# Patient Record
Sex: FEMALE | Race: WHITE | Employment: OTHER | URBAN - METROPOLITAN AREA
[De-identification: names, ages, dates, MRNs, and addresses within clinical notes are randomized per-mention and may not be internally consistent; named-entity substitution may affect disease eponyms.]

---

## 2018-01-30 ENCOUNTER — HOSPITAL ENCOUNTER (OUTPATIENT)
Dept: MAMMOGRAPHY | Facility: HOSPITAL | Age: 68
Discharge: HOME | End: 2018-01-30
Attending: OTOLARYNGOLOGY
Payer: MEDICARE

## 2018-01-30 DIAGNOSIS — Z12.39 ENCOUNTER FOR SPECIAL SCREENING EXAMINATION FOR NEOPLASM OF BREAST: ICD-10-CM

## 2018-01-30 PROCEDURE — 77067 SCR MAMMO BI INCL CAD: CPT | Performed by: INTERNAL MEDICINE

## 2018-10-04 ENCOUNTER — HOSPITAL ENCOUNTER (OUTPATIENT)
Dept: MRI IMAGING | Facility: HOSPITAL | Age: 68
Discharge: HOME | End: 2018-10-04
Attending: ORTHOPAEDIC SURGERY
Payer: MEDICARE

## 2018-10-04 DIAGNOSIS — M75.121 COMPLETE TEAR OF RIGHT ROTATOR CUFF: ICD-10-CM

## 2018-10-04 PROCEDURE — 73221 MRI JOINT UPR EXTREM W/O DYE: CPT | Performed by: ORTHOPAEDIC SURGERY

## 2018-10-16 ENCOUNTER — OFFICE VISIT (OUTPATIENT)
Dept: AUDIOLOGY | Facility: CLINIC | Age: 68
End: 2018-10-16
Payer: MEDICARE

## 2018-10-16 ENCOUNTER — OFFICE VISIT (OUTPATIENT)
Dept: OTOLARYNGOLOGY | Facility: CLINIC | Age: 68
End: 2018-10-16
Payer: MEDICARE

## 2018-10-16 VITALS
HEIGHT: 64 IN | SYSTOLIC BLOOD PRESSURE: 136 MMHG | WEIGHT: 200 LBS | DIASTOLIC BLOOD PRESSURE: 86 MMHG | BODY MASS INDEX: 34.15 KG/M2 | TEMPERATURE: 98 F

## 2018-10-16 DIAGNOSIS — H90.3 SENSORINEURAL HEARING LOSS, BILATERAL: Primary | ICD-10-CM

## 2018-10-16 DIAGNOSIS — H91.93 BILATERAL HEARING LOSS, UNSPECIFIED HEARING LOSS TYPE: Primary | ICD-10-CM

## 2018-10-16 PROCEDURE — 92557 COMPREHENSIVE HEARING TEST: CPT | Performed by: AUDIOLOGIST

## 2018-10-16 PROCEDURE — G0463 HOSPITAL OUTPT CLINIC VISIT: HCPCS | Performed by: OTOLARYNGOLOGY

## 2018-10-16 PROCEDURE — 92567 TYMPANOMETRY: CPT | Performed by: AUDIOLOGIST

## 2018-10-16 PROCEDURE — 99214 OFFICE O/P EST MOD 30 MIN: CPT | Performed by: OTOLARYNGOLOGY

## 2018-10-16 RX ORDER — LISINOPRIL 5 MG/1
5 TABLET ORAL DAILY
COMMUNITY

## 2018-10-16 NOTE — PROGRESS NOTES
Shaneka Palacios is a 77year old female. Patient presents with:  Hearing Loss: bilateral for several years       HISTORY OF PRESENT ILLNESS  She presents with a history of decreased hearing which started about 6 months ago.  She's noted that her right ea History      Marital status:       Spouse name: Not on file      Number of children: Not on file      Years of education: Not on file      Highest education level: Not on file    Social Needs      Financial resource strain: Not on file      Food ins Normal Overall appearance - Normal.   Psychiatric Normal Orientation - Oriented to time, place, person & situation. Appropriate mood and affect.    Neck Exam Normal Inspection - Normal. Palpation - Normal. Parotid gland - Normal. Thyroid gland - Normal.   E hearing loss, unspecified hearing loss type  Slight progression of her high-frequency loss bilaterally and a new asymmetry noted on the right ear at the low frequencies. I did recommend an ABR to rule out a retrocochlear abnormality.   Routine hearing test

## 2018-10-16 NOTE — PROGRESS NOTES
AUDIOGRAM     Kami Boyce was referred for testing by Antonio Nance to evaluate hearing. 9/10/1950  [id number]    Pt noted increased difficulty understanding speech and the TV is played loudly.     Otoscopic Inspection:  Right ear:  No cerumen  [facility] pending medical clearance.     Thank you for this referral.    Hali Carrillo  10/16/2018

## 2018-10-30 ENCOUNTER — OFFICE VISIT (OUTPATIENT)
Dept: AUDIOLOGY | Facility: CLINIC | Age: 68
End: 2018-10-30
Payer: MEDICARE

## 2018-10-30 DIAGNOSIS — IMO0001 ASYMMETRICAL HEARING LOSS, RIGHT: Primary | ICD-10-CM

## 2018-10-30 PROCEDURE — 92585 AUDITORY EVOKED POTENTIAL: CPT | Performed by: AUDIOLOGIST

## 2018-10-30 NOTE — PROGRESS NOTES
ADULT AUDITORY BRAINSTEM RESPONSE (ABR) TEST RESULTS    Ane Vishal was referred for testing by Gwen Mares. Otoscopic Inspection:  Right ear:  no cerumen  Left ear: no cerumen    Stimulus used:  90, [id number] rarefacting clicks presented at 11. 0

## 2018-11-05 ENCOUNTER — TELEPHONE (OUTPATIENT)
Dept: OTOLARYNGOLOGY | Facility: CLINIC | Age: 68
End: 2018-11-05

## 2018-11-05 NOTE — TELEPHONE ENCOUNTER
Marleen Hernandez MD  [facility] Staff             Please let her know that ABR normal. This suggests no tumor of the inner ear nerve.   RTC in 6 months for repeat audiogram.

## 2019-03-22 ENCOUNTER — HOSPITAL ENCOUNTER (OUTPATIENT)
Dept: MAMMOGRAPHY | Facility: HOSPITAL | Age: 69
Discharge: HOME | End: 2019-03-22
Attending: INTERNAL MEDICINE
Payer: MEDICARE

## 2019-03-22 DIAGNOSIS — Z12.31 ENCOUNTER FOR SCREENING MAMMOGRAM FOR MALIGNANT NEOPLASM OF BREAST: ICD-10-CM

## 2019-03-22 PROCEDURE — 77063 BREAST TOMOSYNTHESIS BI: CPT | Performed by: INTERNAL MEDICINE

## 2019-03-22 PROCEDURE — 77067 SCR MAMMO BI INCL CAD: CPT | Performed by: INTERNAL MEDICINE

## 2019-05-13 ENCOUNTER — TELEPHONE (OUTPATIENT)
Dept: OTOLARYNGOLOGY | Facility: CLINIC | Age: 69
End: 2019-05-13

## 2019-05-13 NOTE — TELEPHONE ENCOUNTER
----- Message from Tiki Young RN sent at 11/5/2018  3:56 PM CST -----  Pt is due for repeat hearing test in 6 months.

## 2019-06-28 ENCOUNTER — HOSPITAL ENCOUNTER (OUTPATIENT)
Dept: GENERAL RADIOLOGY | Facility: HOSPITAL | Age: 69
Discharge: HOME | End: 2019-06-28
Attending: PHYSICAL MEDICINE & REHABILITATION
Payer: MEDICARE

## 2019-06-28 ENCOUNTER — TELEPHONE (OUTPATIENT)
Dept: NEUROLOGY | Facility: CLINIC | Age: 69
End: 2019-06-28

## 2019-06-28 ENCOUNTER — OFFICE VISIT (OUTPATIENT)
Dept: NEUROLOGY | Facility: CLINIC | Age: 69
End: 2019-06-28
Payer: MEDICARE

## 2019-06-28 VITALS
BODY MASS INDEX: 34.15 KG/M2 | RESPIRATION RATE: 16 BRPM | HEIGHT: 64 IN | DIASTOLIC BLOOD PRESSURE: 88 MMHG | WEIGHT: 200 LBS | HEART RATE: 80 BPM | SYSTOLIC BLOOD PRESSURE: 144 MMHG

## 2019-06-28 DIAGNOSIS — Z87.19 HISTORY OF ESOPHAGEAL REFLUX: ICD-10-CM

## 2019-06-28 DIAGNOSIS — G47.00 INSOMNIA, UNSPECIFIED TYPE: ICD-10-CM

## 2019-06-28 DIAGNOSIS — M54.2 CERVICALGIA OF OCCIPITO-ATLANTO-AXIAL REGION: Primary | ICD-10-CM

## 2019-06-28 DIAGNOSIS — M54.2 CERVICALGIA OF OCCIPITO-ATLANTO-AXIAL REGION: ICD-10-CM

## 2019-06-28 PROBLEM — M75.100 TEAR OF ROTATOR CUFF: Status: ACTIVE | Noted: 2019-04-04

## 2019-06-28 PROBLEM — M70.71 BURSITIS OF RIGHT HIP: Status: ACTIVE | Noted: 2019-04-04

## 2019-06-28 PROCEDURE — 99205 OFFICE O/P NEW HI 60 MIN: CPT | Performed by: PHYSICAL MEDICINE & REHABILITATION

## 2019-06-28 PROCEDURE — 72052 X-RAY EXAM NECK SPINE 6/>VWS: CPT | Performed by: PHYSICAL MEDICINE & REHABILITATION

## 2019-06-28 RX ORDER — COVID-19 ANTIGEN TEST
220 KIT MISCELLANEOUS 2 TIMES DAILY PRN
COMMUNITY

## 2019-06-28 RX ORDER — MELOXICAM 15 MG/1
15 TABLET ORAL DAILY
Qty: 30 TABLET | Refills: 0 | Status: SHIPPED | OUTPATIENT
Start: 2019-06-28 | End: 2019-08-06

## 2019-06-28 RX ORDER — CYCLOBENZAPRINE HCL 10 MG
10 TABLET ORAL NIGHTLY
Qty: 30 TABLET | Refills: 0 | Status: SHIPPED | OUTPATIENT
Start: 2019-06-28 | End: 2019-07-28

## 2019-06-28 NOTE — PROGRESS NOTES
[address]  Progress Note    CHIEF COMPLAINT:  Patient presents with:  Neck Pain: Patient presents for neck pain for the past 2 years, has worsened over the past 6 months.  C/o left side neck pain, pain radia Meloxicam 15 MG Oral Tab Take 1 tablet (15 mg total) by mouth daily. Disp: 30 tablet Rfl: 0   Cyclobenzaprine HCl 10 MG Oral Tab Take 1 tablet (10 mg total) by mouth nightly. Disp: 30 tablet Rfl: 0   lisinopril 5 MG Oral Tab Take 5 mg by mouth daily.  Dis Position: Sitting, Cuff Size: adult)   Pulse 80   Resp 16   Ht 64\"   Wt 200 lb   BMI 34.33 kg/m²     Body mass index is 34.33 kg/m². General: No immediate distress  Head: Normocephalic/ Atraumatic  Eyes: Extra-occular movements intact.    Ears: No aur 7/12/2019). Discharge Instructions were provided as documented in AVS summary. The patient was in agreement with the assessment and plan. All questions were answered. There were no barriers to learning.         Radames Mortimer, MD  Physical Medicin

## 2019-06-29 PROBLEM — M54.2 CERVICALGIA OF OCCIPITO-ATLANTO-AXIAL REGION: Status: ACTIVE | Noted: 2019-06-29

## 2019-06-29 PROBLEM — Z87.19 HISTORY OF ESOPHAGEAL REFLUX: Status: ACTIVE | Noted: 2019-06-29

## 2019-07-01 ENCOUNTER — MED REC SCAN ONLY (OUTPATIENT)
Dept: NEUROLOGY | Facility: CLINIC | Age: 69
End: 2019-07-01

## 2019-07-01 ENCOUNTER — TELEPHONE (OUTPATIENT)
Dept: NEUROLOGY | Facility: CLINIC | Age: 69
End: 2019-07-01

## 2019-07-01 NOTE — TELEPHONE ENCOUNTER
----- Message from Syeda Denny MD sent at 7/1/2019  9:59 AM CDT -----  I personally reviewed these plain film x-rays of the cervical spine done on 6/28/2019. They show extensive osteoarthritis of the facet joints bilaterally.   Of note, there is left

## 2019-07-08 ENCOUNTER — OFFICE VISIT (OUTPATIENT)
Dept: NEUROLOGY | Facility: CLINIC | Age: 69
End: 2019-07-08
Payer: MEDICARE

## 2019-07-08 ENCOUNTER — TELEPHONE (OUTPATIENT)
Dept: NEUROLOGY | Facility: CLINIC | Age: 69
End: 2019-07-08

## 2019-07-08 VITALS
BODY MASS INDEX: 34.15 KG/M2 | SYSTOLIC BLOOD PRESSURE: 140 MMHG | WEIGHT: 200 LBS | HEART RATE: 68 BPM | RESPIRATION RATE: 16 BRPM | DIASTOLIC BLOOD PRESSURE: 88 MMHG | HEIGHT: 64 IN

## 2019-07-08 DIAGNOSIS — G47.00 INSOMNIA, UNSPECIFIED TYPE: ICD-10-CM

## 2019-07-08 DIAGNOSIS — Z87.19 HISTORY OF ESOPHAGEAL REFLUX: ICD-10-CM

## 2019-07-08 DIAGNOSIS — M54.2 CERVICALGIA OF OCCIPITO-ATLANTO-AXIAL REGION: Primary | ICD-10-CM

## 2019-07-08 DIAGNOSIS — M79.18 MYOFASCIAL PAIN: ICD-10-CM

## 2019-07-08 PROCEDURE — 20552 NJX 1/MLT TRIGGER POINT 1/2: CPT | Performed by: PHYSICAL MEDICINE & REHABILITATION

## 2019-07-08 PROCEDURE — 99214 OFFICE O/P EST MOD 30 MIN: CPT | Performed by: PHYSICAL MEDICINE & REHABILITATION

## 2019-07-08 RX ORDER — DULOXETIN HYDROCHLORIDE 30 MG/1
30 CAPSULE, DELAYED RELEASE ORAL DAILY
Qty: 30 CAPSULE | Refills: 0 | Status: SHIPPED | OUTPATIENT
Start: 2019-07-08 | End: 2019-08-06

## 2019-07-08 NOTE — PROGRESS NOTES
[address]  Progress Note    CHIEF COMPLAINT:  Patient presents with:  Neck Pain: Patient presents for follow up on neck pain, LOV:6/28/19.  States she still has pain, stopped the flexeril did not get relief, Take 1 capsule (30 mg total) by mouth daily. Disp: 30 capsule Rfl: 0   Naproxen Sodium (ALEVE) 220 MG Oral Cap Take 220 mg by mouth 2 (two) times daily as needed. Disp:  Rfl:    Meloxicam 15 MG Oral Tab Take 1 tablet (15 mg total) by mouth daily.  Disp: 30 and limited flexion with pain. Extension is also limited but less painful. Trapezii are nontender laterally.   Neuro:   Cognition: alert & oriented x 3, attentive, comprehention intact, spontaneous speech intact  Strength:  Upper extremities have 5/5 stre agreement with the assessment and plan. All questions were answered. There were no barriers to learning.         Geri Bennett MD  Physical Medicine and Rehabilitation/Sports Medicine  MEDICAL CENTER [facility]

## 2019-07-08 NOTE — TELEPHONE ENCOUNTER
Medicare online for insurance coverage of Trigger point injection left suboccipital cpt code   . Insurance was verified and procedure is a cover benefit and does not require authorization. Will inform Nursing.

## 2019-07-11 RX ORDER — TRIAMCINOLONE ACETONIDE 40 MG/ML
20 INJECTION, SUSPENSION INTRA-ARTICULAR; INTRAMUSCULAR ONCE
Status: COMPLETED | OUTPATIENT
Start: 2019-07-08 | End: 2019-07-08

## 2019-07-11 RX ORDER — LIDOCAINE HYDROCHLORIDE 10 MG/ML
2 INJECTION, SOLUTION INFILTRATION; PERINEURAL ONCE
Status: COMPLETED | OUTPATIENT
Start: 2019-07-08 | End: 2019-07-08

## 2019-07-25 RX ORDER — MELOXICAM 15 MG/1
15 TABLET ORAL DAILY
Qty: 30 TABLET | Refills: 0 | OUTPATIENT
Start: 2019-07-25

## 2019-07-25 NOTE — TELEPHONE ENCOUNTER
Medication request: Meloxicam 15 mg, Take 1 tablet by mouth daily.  Qt 30 Refills 0    LOV: 7/8/19  NOV: 8/6/19    Last refill: 6/28/19    -Should the patient still have Meloxicam?

## 2019-07-25 NOTE — TELEPHONE ENCOUNTER
It would be best if she stopped it now that she's on Cymbalta. Would mess up her stomach long term.  (Has GERD)

## 2019-07-30 NOTE — TELEPHONE ENCOUNTER
Medication request: Cymbalta 30 mg, Take 1 capsule by mouth daily.  Qt 30 Refills 0    LOV: 7/8/19  NOV: 8/6/19    Last refill: 7/8/19

## 2019-08-06 ENCOUNTER — OFFICE VISIT (OUTPATIENT)
Dept: NEUROLOGY | Facility: CLINIC | Age: 69
End: 2019-08-06
Payer: MEDICARE

## 2019-08-06 VITALS
SYSTOLIC BLOOD PRESSURE: 142 MMHG | WEIGHT: 200 LBS | HEIGHT: 64 IN | BODY MASS INDEX: 34.15 KG/M2 | DIASTOLIC BLOOD PRESSURE: 78 MMHG | RESPIRATION RATE: 16 BRPM | HEART RATE: 76 BPM

## 2019-08-06 DIAGNOSIS — Z87.19 HISTORY OF ESOPHAGEAL REFLUX: ICD-10-CM

## 2019-08-06 DIAGNOSIS — G47.00 INSOMNIA, UNSPECIFIED TYPE: ICD-10-CM

## 2019-08-06 DIAGNOSIS — M79.18 MYOFASCIAL PAIN: ICD-10-CM

## 2019-08-06 DIAGNOSIS — M54.2 CERVICALGIA OF OCCIPITO-ATLANTO-AXIAL REGION: Primary | ICD-10-CM

## 2019-08-06 PROCEDURE — 99214 OFFICE O/P EST MOD 30 MIN: CPT | Performed by: PHYSICAL MEDICINE & REHABILITATION

## 2019-08-06 RX ORDER — DULOXETIN HYDROCHLORIDE 30 MG/1
30 CAPSULE, DELAYED RELEASE ORAL DAILY
Qty: 30 CAPSULE | Refills: 0 | OUTPATIENT
Start: 2019-08-06

## 2019-08-06 RX ORDER — DULOXETIN HYDROCHLORIDE 60 MG/1
60 CAPSULE, DELAYED RELEASE ORAL DAILY
Qty: 30 CAPSULE | Refills: 0 | Status: SHIPPED | OUTPATIENT
Start: 2019-08-06 | End: 2019-08-28

## 2019-08-06 NOTE — PROGRESS NOTES
[address]  Progress Note    CHIEF COMPLAINT:  Patient presents with:  Neck Pain: Patient presents for follow up on neck pain, LOV:7/8/19.  Had a trigger point injection on last visit, doing PT and feels it s HCl 60 MG Oral Cap DR Particles Take 1 capsule (60 mg total) by mouth daily. Disp: 30 capsule Rfl: 0   PEG 3350-KCl-Na Bicarb-NaCl (TRILYTE) 420 g Oral Recon Soln Take 420 g by mouth as needed.  Disp: 1 Bottle Rfl: 0   Naproxen Sodium (ALEVE) 220 MG Oral Ca & oriented x 3, attentive, comprehention intact, spontaneous speech intact  Strength:  Upper extremities have 5/5 strength  Sensation: Normal upper extremities  Reflexes: Normal upper extremities  Spurling's sign: neg        Data    Radiology Imaging:  magali

## 2019-08-28 RX ORDER — DULOXETIN HYDROCHLORIDE 60 MG/1
60 CAPSULE, DELAYED RELEASE ORAL DAILY
Qty: 30 CAPSULE | Refills: 0 | Status: SHIPPED | OUTPATIENT
Start: 2019-08-28 | End: 2019-09-20

## 2019-08-28 RX ORDER — DULOXETIN HYDROCHLORIDE 60 MG/1
CAPSULE, DELAYED RELEASE ORAL
Qty: 30 CAPSULE | Refills: 0 | OUTPATIENT
Start: 2019-08-28

## 2019-08-28 NOTE — TELEPHONE ENCOUNTER
Medication request: Duloxetine HCI 60 MG  Take 1 capsule by mouth daily    LOV   8/6/2019  NOV  9/6/2019

## 2019-09-06 ENCOUNTER — TELEPHONE (OUTPATIENT)
Dept: NEUROLOGY | Facility: CLINIC | Age: 69
End: 2019-09-06

## 2019-09-06 ENCOUNTER — OFFICE VISIT (OUTPATIENT)
Dept: NEUROLOGY | Facility: CLINIC | Age: 69
End: 2019-09-06
Payer: MEDICARE

## 2019-09-06 VITALS
HEIGHT: 64 IN | WEIGHT: 205 LBS | BODY MASS INDEX: 35 KG/M2 | SYSTOLIC BLOOD PRESSURE: 144 MMHG | HEART RATE: 72 BPM | RESPIRATION RATE: 16 BRPM | DIASTOLIC BLOOD PRESSURE: 82 MMHG

## 2019-09-06 DIAGNOSIS — M47.812 CERVICAL SPONDYLOSIS: Primary | ICD-10-CM

## 2019-09-06 DIAGNOSIS — M79.18 MYOFASCIAL PAIN: ICD-10-CM

## 2019-09-06 DIAGNOSIS — Z87.19 HISTORY OF ESOPHAGEAL REFLUX: ICD-10-CM

## 2019-09-06 DIAGNOSIS — G47.00 INSOMNIA, UNSPECIFIED TYPE: ICD-10-CM

## 2019-09-06 PROCEDURE — 99214 OFFICE O/P EST MOD 30 MIN: CPT | Performed by: PHYSICAL MEDICINE & REHABILITATION

## 2019-09-06 NOTE — TELEPHONE ENCOUNTER
Medicare Online for insurance coverage of  Left C1-2 and C2-3 facet injections cpt codes 26301, 94409. Insurance was verified and procedure is a covered benefit and does not require authorization. Will inform Nursing.

## 2019-09-06 NOTE — TELEPHONE ENCOUNTER
Patient has been scheduled for a Left C1-2 and C2-3 facet injections on 09/12/19 at the [facility]. Medications and allergies reviewed. Patient informed to hold aspirins, nsaids, blood thinners, multivitamins, vitamin E and fish oils 3-7 days prior to procedure.

## 2019-09-06 NOTE — PROGRESS NOTES
[address]  Progress Note    CHIEF COMPLAINT:  Patient presents with:  Neck Pain: Patient present for a follow up on neck pain.  States that the pain is not getting better, the ache in the neck is always ther (ALEVE) 220 MG Oral Cap Take 220 mg by mouth 2 (two) times daily as needed. Disp:  Rfl:    lisinopril 5 MG Oral Tab Take 5 mg by mouth daily. Disp:  Rfl:    Metoprolol Succinate ER 50 MG Oral Tablet 24 Hr Take 50 mg by mouth daily.    Disp:  Rfl:    Calcium strength  Sensation: Normal upper extremities  Reflexes: Normal upper extremities  Spurling's sign: neg        Data    Radiology Imaging:  plain film x-rays of the cervical spine done on 6/28/2019. Christina Guevara show extensive osteoarthritis of the facet joints bi

## 2019-09-12 ENCOUNTER — OFFICE VISIT (OUTPATIENT)
Dept: SURGERY | Facility: CLINIC | Age: 69
End: 2019-09-12
Payer: MEDICARE

## 2019-09-12 DIAGNOSIS — M79.18 MYOFASCIAL PAIN: ICD-10-CM

## 2019-09-12 DIAGNOSIS — M47.812 CERVICAL SPONDYLOSIS: Primary | ICD-10-CM

## 2019-09-12 PROCEDURE — 64490 INJ PARAVERT F JNT C/T 1 LEV: CPT | Performed by: PHYSICAL MEDICINE & REHABILITATION

## 2019-09-12 PROCEDURE — 20552 NJX 1/MLT TRIGGER POINT 1/2: CPT | Performed by: PHYSICAL MEDICINE & REHABILITATION

## 2019-09-15 PROBLEM — M47.812 CERVICAL SPONDYLOSIS: Status: ACTIVE | Noted: 2019-09-15

## 2019-09-15 NOTE — PROCEDURES
Preoperative Diagnosis:  (M47.812) Cervical spondylosis  (primary encounter diagnosis)    (M79.18) Myofascial pain       Postoperative Diagnosis:  (K98.718) Cervical spondylosis  (primary encounter diagnosis)    (M79.18) Myofascial pain       Procedures:

## 2019-09-19 PROCEDURE — 88305 TISSUE EXAM BY PATHOLOGIST: CPT | Performed by: INTERNAL MEDICINE

## 2019-09-20 RX ORDER — DULOXETIN HYDROCHLORIDE 60 MG/1
CAPSULE, DELAYED RELEASE ORAL
Qty: 30 CAPSULE | Refills: 0 | Status: SHIPPED | OUTPATIENT
Start: 2019-09-20 | End: 2019-10-15

## 2019-09-20 NOTE — TELEPHONE ENCOUNTER
Medication request: Duloxetine 60 mg, Take 1 capsule by mouth daily.  Qt 30 Refills 0     LOV: 9/6/19  NOV:10/2/19    Last refill: 8/28/19

## 2019-10-02 ENCOUNTER — TELEPHONE (OUTPATIENT)
Dept: NEUROLOGY | Facility: CLINIC | Age: 69
End: 2019-10-02

## 2019-10-02 ENCOUNTER — OFFICE VISIT (OUTPATIENT)
Dept: NEUROLOGY | Facility: CLINIC | Age: 69
End: 2019-10-02
Payer: MEDICARE

## 2019-10-02 VITALS
BODY MASS INDEX: 34.66 KG/M2 | HEART RATE: 84 BPM | RESPIRATION RATE: 16 BRPM | HEIGHT: 64 IN | SYSTOLIC BLOOD PRESSURE: 110 MMHG | DIASTOLIC BLOOD PRESSURE: 86 MMHG | WEIGHT: 203 LBS

## 2019-10-02 DIAGNOSIS — M47.812 CERVICAL SPONDYLOSIS: Primary | ICD-10-CM

## 2019-10-02 DIAGNOSIS — Z87.19 HISTORY OF ESOPHAGEAL REFLUX: ICD-10-CM

## 2019-10-02 PROCEDURE — 99214 OFFICE O/P EST MOD 30 MIN: CPT | Performed by: PHYSICAL MEDICINE & REHABILITATION

## 2019-10-02 NOTE — TELEPHONE ENCOUNTER
Medicare Online for insurance coverage of Left C0-C1,C1-C2, C2-C3  facet injections **SCHEDULE WITH DR. Sue Ramsay ONLY PLEASE** cpt codes [id number], [id number], [id number]. Insurance was verified and procedure is a covered benefit and does not require authorization.   Will

## 2019-10-02 NOTE — PROGRESS NOTES
[address]  Progress Note    CHIEF COMPLAINT:  Patient presents with:  Neck Pain: Patient presents for follow up on Left C23 Facet injection, got 25% relief from the injection, rated pain a 6/10.         Hist Oral Tab Take 5 mg by mouth daily. • Metoprolol Succinate ER 50 MG Oral Tablet 24 Hr Take 50 mg by mouth daily. • Calcium Carbonate-Vitamin D (CALCIUM 500 + D OR) Take 1 tablet by mouth daily.      • allopurinol 100 MG Oral Tab Take 100 mg by mout of the cervical spine done on 6/28/2019. Megan Post show extensive osteoarthritis of the facet joints bilaterally.  Of note, there is left C1-2 joint arthritis which corresponds with her symptoms.        ASSESSMENT AND PLAN:  1.  Cervical spondylosis  I recommen

## 2019-10-02 NOTE — TELEPHONE ENCOUNTER
Patient has been scheduled for a   Left C0-C1,C1-C2, C2-C3  facet injections on 10/29/19 at the [facility]. Medications and allergies reviewed.  Patient informed to hold aspirins, nsaids, blood thinners, multivitamins, vitamin E and fish oils 3-7 days prior to pr

## 2019-10-15 RX ORDER — DULOXETIN HYDROCHLORIDE 60 MG/1
60 CAPSULE, DELAYED RELEASE ORAL
Qty: 90 CAPSULE | Refills: 0 | Status: SHIPPED | OUTPATIENT
Start: 2019-10-15 | End: 2020-01-09

## 2019-10-15 NOTE — TELEPHONE ENCOUNTER
Medication request: Duloxetine 60 mg, Take 1 capsule by mouth daily. Qt 90 Refills 0     LOV: 10/2/19  NOV: None  Left C0-C1,C1-C2, C2-C3  facet injections scheduled w/: 10/29/19      Last refill:9/20/19    Requesting a 90 day supply.

## 2019-10-29 ENCOUNTER — OFFICE VISIT (OUTPATIENT)
Dept: SURGERY | Facility: CLINIC | Age: 69
End: 2019-10-29
Payer: MEDICARE

## 2019-10-29 DIAGNOSIS — M47.812 ARTHROPATHY OF CERVICAL FACET JOINT: Primary | ICD-10-CM

## 2019-10-29 DIAGNOSIS — M54.2 CERVICALGIA OF OCCIPITO-ATLANTO-AXIAL REGION: ICD-10-CM

## 2019-10-29 PROCEDURE — 64492 INJ PARAVERT F JNT C/T 3 LEV: CPT | Performed by: PHYSICAL MEDICINE & REHABILITATION

## 2019-10-29 PROCEDURE — 64491 INJ PARAVERT F JNT C/T 2 LEV: CPT | Performed by: PHYSICAL MEDICINE & REHABILITATION

## 2019-10-29 PROCEDURE — 64490 INJ PARAVERT F JNT C/T 1 LEV: CPT | Performed by: PHYSICAL MEDICINE & REHABILITATION

## 2019-10-29 NOTE — PROCEDURES
Mark Shepherd.    CERVICAL Z-JOINT/FACET INJECTION  NAME:  Treva Ro    MR #:    [id number] :  9/10/1950     PHYSICIAN:  Chidi Davalos        Operative Report    DATE OF PROCEDURE: 10/29/2019   PREOPERATIVE DIAGNOSES: 1.  Art throughout the whole procedure, prior to injection of any medication, aspiration was performed. No blood, fluid, or air was aspirated at anytime.

## 2019-10-31 ENCOUNTER — TELEPHONE (OUTPATIENT)
Dept: NEUROLOGY | Facility: CLINIC | Age: 69
End: 2019-10-31

## 2019-10-31 DIAGNOSIS — M54.2 CERVICALGIA OF OCCIPITO-ATLANTO-AXIAL REGION: Primary | ICD-10-CM

## 2019-10-31 DIAGNOSIS — M99.01 SOMATIC DYSFUNCTION OF SPINE, CERVICAL: Primary | ICD-10-CM

## 2019-10-31 NOTE — TELEPHONE ENCOUNTER
S/w patient and informed her I will have Dr. Kirsten Story place the order for OMT with Dr. Rigo Gage.

## 2019-11-01 ENCOUNTER — TELEPHONE (OUTPATIENT)
Dept: NEUROLOGY | Facility: CLINIC | Age: 69
End: 2019-11-01

## 2019-11-01 NOTE — TELEPHONE ENCOUNTER
Medicare Online for authorization of procedure OMT of cervical region cpt code 79818. Procedure is a covered benefit and does not require authorization.

## 2019-11-04 NOTE — TELEPHONE ENCOUNTER
Per Akosua Gann:  Medicare Online for authorization of procedure OMT of cervical region cpt code 90994. Procedure is a covered benefit and does not require authorization. Front Office:  Please schedule this patient for appointment with Dr. Karlos Chacon for OMT.

## 2019-11-07 ENCOUNTER — OFFICE VISIT (OUTPATIENT)
Dept: NEUROLOGY | Facility: CLINIC | Age: 69
End: 2019-11-07
Payer: MEDICARE

## 2019-11-07 VITALS
WEIGHT: 205 LBS | HEIGHT: 64 IN | RESPIRATION RATE: 16 BRPM | DIASTOLIC BLOOD PRESSURE: 68 MMHG | HEART RATE: 84 BPM | SYSTOLIC BLOOD PRESSURE: 120 MMHG | BODY MASS INDEX: 35 KG/M2

## 2019-11-07 DIAGNOSIS — M99.02 THORACIC REGION SOMATIC DYSFUNCTION: ICD-10-CM

## 2019-11-07 DIAGNOSIS — M99.01 CERVICAL (NECK) REGION SOMATIC DYSFUNCTION: ICD-10-CM

## 2019-11-07 PROCEDURE — 98925 OSTEOPATH MANJ 1-2 REGIONS: CPT | Performed by: PHYSICAL MEDICINE & REHABILITATION

## 2019-11-07 NOTE — PROGRESS NOTES
OMT PROCEDURE DOCUMENTATION    OMT Procedure  Procedure: OMT to thoracic, cervical regions   Heat applied: No   Somatic dysfunction body location(s): Cervical, Thoracic   Number of body regions: 2       HEAD CERVICAL          Cervical vertebrae: OA, C1, C2

## 2019-11-14 ENCOUNTER — OFFICE VISIT (OUTPATIENT)
Dept: NEUROLOGY | Facility: CLINIC | Age: 69
End: 2019-11-14
Payer: MEDICARE

## 2019-11-14 VITALS — HEIGHT: 64 IN | BODY MASS INDEX: 35 KG/M2 | WEIGHT: 205 LBS

## 2019-11-14 DIAGNOSIS — M47.812 ARTHROPATHY OF CERVICAL FACET JOINT: ICD-10-CM

## 2019-11-14 DIAGNOSIS — M99.01 SOMATIC DYSFUNCTION OF CERVICAL REGION: ICD-10-CM

## 2019-11-14 PROCEDURE — 98926 OSTEOPATH MANJ 3-4 REGIONS: CPT | Performed by: PHYSICAL MEDICINE & REHABILITATION

## 2019-11-14 NOTE — PROGRESS NOTES
OMT PROCEDURE DOCUMENTATION    OMT Procedure  Procedure: OMT to cervical, thoracic, suboccipital regions   Somatic dysfunction body location(s): Cervical, Thoracic, Head   Number of body regions: 3       HEAD CERVICAL   Severity: Moderate   Technique used:

## 2019-11-26 ENCOUNTER — OFFICE VISIT (OUTPATIENT)
Dept: NEUROLOGY | Facility: CLINIC | Age: 69
End: 2019-11-26
Payer: MEDICARE

## 2019-11-26 VITALS — HEIGHT: 64 IN | BODY MASS INDEX: 35 KG/M2 | WEIGHT: 205 LBS

## 2019-11-26 DIAGNOSIS — M99.01 SOMATIC DYSFUNCTION OF SPINE, CERVICAL: ICD-10-CM

## 2019-11-26 PROCEDURE — 98925 OSTEOPATH MANJ 1-2 REGIONS: CPT | Performed by: PHYSICAL MEDICINE & REHABILITATION

## 2019-11-26 NOTE — PATIENT INSTRUCTIONS
Do exercise number one no more than 6 sets per day, 5-7 repetitions each time. Next week add exercise number 2, 3-5 sets per day, 3-5 repetitions each time.

## 2019-11-26 NOTE — PROGRESS NOTES
OMT PROCEDURE DOCUMENTATION    OMT Procedure  Procedure: OMT to cervical, suboccipital regions   Heat applied: No   Somatic dysfunction body location(s): Cervical   Number of body regions: 1       HEAD CERVICAL          Cervical vertebrae: C2, C1   Severit

## 2019-12-02 ENCOUNTER — OFFICE VISIT (OUTPATIENT)
Dept: NEUROLOGY | Facility: CLINIC | Age: 69
End: 2019-12-02
Payer: MEDICARE

## 2019-12-02 VITALS
WEIGHT: 205 LBS | SYSTOLIC BLOOD PRESSURE: 128 MMHG | RESPIRATION RATE: 16 BRPM | HEART RATE: 76 BPM | BODY MASS INDEX: 35 KG/M2 | DIASTOLIC BLOOD PRESSURE: 72 MMHG | HEIGHT: 64 IN

## 2019-12-02 DIAGNOSIS — M99.01 SOMATIC DYSFUNCTION OF SPINE, CERVICAL: ICD-10-CM

## 2019-12-02 PROCEDURE — 98925 OSTEOPATH MANJ 1-2 REGIONS: CPT | Performed by: PHYSICAL MEDICINE & REHABILITATION

## 2019-12-02 NOTE — PROGRESS NOTES
OMT PROCEDURE DOCUMENTATION    OMT Procedure  Procedure: OMT to cervical, OA region   Heat applied: No   Somatic dysfunction body location(s): Cervical   Number of body regions: 1       HEAD CERVICAL          Cervical vertebrae: C1, C2   Severity: Moderate

## 2019-12-12 ENCOUNTER — MED REC SCAN ONLY (OUTPATIENT)
Dept: NEUROLOGY | Facility: CLINIC | Age: 69
End: 2019-12-12

## 2019-12-12 ENCOUNTER — OFFICE VISIT (OUTPATIENT)
Dept: NEUROLOGY | Facility: CLINIC | Age: 69
End: 2019-12-12
Payer: MEDICARE

## 2019-12-12 VITALS — HEIGHT: 64 IN | BODY MASS INDEX: 35 KG/M2 | WEIGHT: 205 LBS

## 2019-12-12 DIAGNOSIS — M99.01 SOMATIC DYSFUNCTION OF CERVICAL REGION: ICD-10-CM

## 2019-12-12 PROCEDURE — 98925 OSTEOPATH MANJ 1-2 REGIONS: CPT | Performed by: PHYSICAL MEDICINE & REHABILITATION

## 2019-12-12 NOTE — PROGRESS NOTES
OMT PROCEDURE DOCUMENTATION    OMT Procedure  Procedure: OMT to cervical region   Heat applied: No   Somatic dysfunction body location(s): Cervical   Number of body regions: 1       HEAD CERVICAL          Cervical vertebrae: C1, C2   Severity: Moderate   T

## 2020-01-07 ENCOUNTER — OFFICE VISIT (OUTPATIENT)
Dept: NEUROLOGY | Facility: CLINIC | Age: 70
End: 2020-01-07
Payer: MEDICARE

## 2020-01-07 ENCOUNTER — MED REC SCAN ONLY (OUTPATIENT)
Dept: NEUROLOGY | Facility: CLINIC | Age: 70
End: 2020-01-07

## 2020-01-07 VITALS
BODY MASS INDEX: 35.87 KG/M2 | SYSTOLIC BLOOD PRESSURE: 126 MMHG | HEIGHT: 63.5 IN | DIASTOLIC BLOOD PRESSURE: 74 MMHG | WEIGHT: 205 LBS | HEART RATE: 70 BPM

## 2020-01-07 DIAGNOSIS — M99.01 SOMATIC DYSFUNCTION OF CERVICAL REGION: Primary | ICD-10-CM

## 2020-01-07 PROCEDURE — 98925 OSTEOPATH MANJ 1-2 REGIONS: CPT | Performed by: PHYSICAL MEDICINE & REHABILITATION

## 2020-01-07 NOTE — PROGRESS NOTES
OMT PROCEDURE DOCUMENTATION    OMT Procedure  Procedure: OMT to cervical, OA regions   Heat applied: No   Somatic dysfunction body location(s): Cervical   Number of body regions: 1       HEAD CERVICAL          Cervical vertebrae: C1, C2   Severity: Francesca Potash

## 2020-01-09 RX ORDER — DULOXETIN HYDROCHLORIDE 60 MG/1
60 CAPSULE, DELAYED RELEASE ORAL DAILY
Qty: 90 CAPSULE | Refills: 0 | Status: SHIPPED | OUTPATIENT
Start: 2020-01-09 | End: 2020-04-10

## 2020-01-09 NOTE — TELEPHONE ENCOUNTER
Medication request: Duloxetine 60 mg, Take 1 capsule by mouth once daily.  Qt 90 Refills 0    LOV:10/2/19  NOV:1/21/20-With  for OMT    Last refill: 10/15/19

## 2020-01-21 ENCOUNTER — OFFICE VISIT (OUTPATIENT)
Dept: NEUROLOGY | Facility: CLINIC | Age: 70
End: 2020-01-21
Payer: MEDICARE

## 2020-01-21 VITALS — RESPIRATION RATE: 16 BRPM | HEIGHT: 63.75 IN | WEIGHT: 205 LBS | BODY MASS INDEX: 35.43 KG/M2

## 2020-01-21 DIAGNOSIS — M99.01 SOMATIC DYSFUNCTION OF CERVICAL REGION: ICD-10-CM

## 2020-01-21 PROCEDURE — 98925 OSTEOPATH MANJ 1-2 REGIONS: CPT | Performed by: PHYSICAL MEDICINE & REHABILITATION

## 2020-01-21 NOTE — PROGRESS NOTES
OMT PROCEDURE DOCUMENTATION    OMT Procedure  Procedure: OMT thoracic and cervical regions   Heat applied: No   Somatic dysfunction body location(s): Thoracic, Cervical   Number of body regions: 2       HEAD CERVICAL          Cervical vertebrae: C1, C2   S

## 2020-02-04 ENCOUNTER — OFFICE VISIT (OUTPATIENT)
Dept: NEUROLOGY | Facility: CLINIC | Age: 70
End: 2020-02-04
Payer: MEDICARE

## 2020-02-04 VITALS — RESPIRATION RATE: 16 BRPM | BODY MASS INDEX: 35 KG/M2 | HEIGHT: 64 IN | WEIGHT: 205 LBS

## 2020-02-04 DIAGNOSIS — M47.812 ARTHROPATHY OF CERVICAL FACET JOINT: Primary | ICD-10-CM

## 2020-02-04 PROCEDURE — 98925 OSTEOPATH MANJ 1-2 REGIONS: CPT | Performed by: PHYSICAL MEDICINE & REHABILITATION

## 2020-02-04 RX ORDER — NAPROXEN 500 MG/1
TABLET ORAL
Qty: 60 TABLET | Refills: 0 | Status: SHIPPED | OUTPATIENT
Start: 2020-02-04 | End: 2020-02-26

## 2020-02-11 ENCOUNTER — TELEPHONE (OUTPATIENT)
Dept: NEUROLOGY | Facility: CLINIC | Age: 70
End: 2020-02-11

## 2020-02-11 NOTE — TELEPHONE ENCOUNTER
Patient calling with status update after starting Naproxen. She has been using BID since 02/05/20 and states her neck and head pain feels pretty good, at the worst it is a 2/10.  She will try to decrease the medication to once a day after a week and then us

## 2020-02-26 DIAGNOSIS — M47.812 ARTHROPATHY OF CERVICAL FACET JOINT: ICD-10-CM

## 2020-02-26 RX ORDER — NAPROXEN 500 MG/1
TABLET ORAL
Qty: 60 TABLET | Refills: 0 | Status: SHIPPED | OUTPATIENT
Start: 2020-03-04 | End: 2020-04-07

## 2020-02-26 NOTE — TELEPHONE ENCOUNTER
Spoke to patient to get an update. State the Naproxen is great. She took the medication twice for 1 week and since been taking it once daily. The pain is minimal 2/10at time and at time gone but is still taking the Naproxen daily.  Asking to refill the Napr

## 2020-04-07 DIAGNOSIS — M47.812 ARTHROPATHY OF CERVICAL FACET JOINT: ICD-10-CM

## 2020-04-07 RX ORDER — NAPROXEN 500 MG/1
TABLET ORAL
Qty: 60 TABLET | Refills: 0 | Status: SHIPPED | OUTPATIENT
Start: 2020-04-07 | End: 2020-05-04

## 2020-04-07 NOTE — TELEPHONE ENCOUNTER
Refill request for naproxen 500 mg, BID PRN, #60, no refills    LOV: 2/4/20  NOV: none  Last refilled on 3/4/20

## 2020-04-10 RX ORDER — DULOXETIN HYDROCHLORIDE 60 MG/1
CAPSULE, DELAYED RELEASE ORAL
Qty: 90 CAPSULE | Refills: 0 | Status: SHIPPED | OUTPATIENT
Start: 2020-04-10 | End: 2020-07-06

## 2020-04-10 NOTE — TELEPHONE ENCOUNTER
Medication request: Duloxetine 60mg 1 CAP QD    LOV: 02/04/20  NOV: none    [place] 01/09/20 #90 r-0

## 2020-05-03 DIAGNOSIS — M47.812 ARTHROPATHY OF CERVICAL FACET JOINT: ICD-10-CM

## 2020-05-04 RX ORDER — NAPROXEN 500 MG/1
500 TABLET ORAL 2 TIMES DAILY PRN
Qty: 60 TABLET | Refills: 0 | Status: SHIPPED | OUTPATIENT
Start: 2020-05-04 | End: 2020-06-01

## 2020-05-04 NOTE — TELEPHONE ENCOUNTER
Medication request: Naproxen 500mg 1 TAB BID PRN    LOV: 02/04/20  NOV: none    [place] 04/07/20 #60 r-0

## 2020-05-30 DIAGNOSIS — M47.812 ARTHROPATHY OF CERVICAL FACET JOINT: ICD-10-CM

## 2020-06-01 RX ORDER — NAPROXEN 500 MG/1
500 TABLET ORAL 2 TIMES DAILY PRN
Qty: 60 TABLET | Refills: 0 | Status: SHIPPED | OUTPATIENT
Start: 2020-06-01 | End: 2020-09-21

## 2020-06-01 NOTE — TELEPHONE ENCOUNTER
Refill request for naproxen 500 mg, BID PRN, #60, no refills    LOV: 2/4/20  NOV: none  Last refilled on 5/4

## 2020-07-06 RX ORDER — DULOXETIN HYDROCHLORIDE 60 MG/1
CAPSULE, DELAYED RELEASE ORAL
Qty: 90 CAPSULE | Refills: 0 | Status: SHIPPED | OUTPATIENT
Start: 2020-07-06 | End: 2020-09-28

## 2020-07-06 NOTE — TELEPHONE ENCOUNTER
Duloxetine 60 mg, take 1 cap daily, #90, no refills    LOV: 2/4/20  NOV: none  Last refilled on 4/10

## 2020-09-21 DIAGNOSIS — M47.812 ARTHROPATHY OF CERVICAL FACET JOINT: ICD-10-CM

## 2020-09-21 RX ORDER — NAPROXEN 500 MG/1
500 TABLET ORAL 2 TIMES DAILY PRN
Qty: 60 TABLET | Refills: 0 | Status: SHIPPED | OUTPATIENT
Start: 2020-09-21

## 2020-09-21 NOTE — TELEPHONE ENCOUNTER
Medication request: Naproxen 500 mg oral Tab. Take 1 tablet two times daily as needed. #60. No refills.      LOV: 02/04/2020  NOV: None    [place]: 05/30/2020

## 2020-09-28 RX ORDER — DULOXETIN HYDROCHLORIDE 60 MG/1
CAPSULE, DELAYED RELEASE ORAL
Qty: 90 CAPSULE | Refills: 3 | Status: SHIPPED | OUTPATIENT
Start: 2020-09-28 | End: 2021-11-01

## 2020-09-28 NOTE — TELEPHONE ENCOUNTER
I agree; if I receive any med refill requests for her in the future I'll send them your way, and if she would like to continue OMT she can come see me for that specifically. Thank you.

## 2020-09-28 NOTE — TELEPHONE ENCOUNTER
I gave her a 1 year refill of Cymbalta, but I think she should be getting refills and treatment from one [facility] physician so we don't have confusion. Js Gordon, you have been refilling her Naprosyn. I have not seen her since Nov 2019.  She was following with you u

## 2020-09-28 NOTE — TELEPHONE ENCOUNTER
Refill request for duloxetine 60 mg, take 1 cap daily, #90, no refills    LOV: 2/4/20  NOV: None  Last refilled on 7/6/20 for 90 day supply

## 2020-11-05 ENCOUNTER — HOSPITAL ENCOUNTER (OUTPATIENT)
Dept: BONE DENSITY | Facility: HOSPITAL | Age: 70
Discharge: HOME | End: 2020-11-05
Attending: NURSE PRACTITIONER
Payer: MEDICARE

## 2020-11-05 DIAGNOSIS — Z78.0 MENOPAUSE: ICD-10-CM

## 2020-11-05 PROCEDURE — 77080 DXA BONE DENSITY AXIAL: CPT | Performed by: NURSE PRACTITIONER

## 2020-12-01 ENCOUNTER — HOSPITAL ENCOUNTER (OUTPATIENT)
Dept: MAMMOGRAPHY | Facility: HOSPITAL | Age: 70
Discharge: HOME | End: 2020-12-01
Attending: NURSE PRACTITIONER
Payer: MEDICARE

## 2020-12-01 DIAGNOSIS — Z12.31 ENCOUNTER FOR SCREENING MAMMOGRAM FOR MALIGNANT NEOPLASM OF BREAST: ICD-10-CM

## 2020-12-01 PROCEDURE — 77063 BREAST TOMOSYNTHESIS BI: CPT | Performed by: NURSE PRACTITIONER

## 2020-12-01 PROCEDURE — 77067 SCR MAMMO BI INCL CAD: CPT | Performed by: NURSE PRACTITIONER

## 2021-06-11 ENCOUNTER — OFFICE VISIT (OUTPATIENT)
Dept: OTOLARYNGOLOGY | Facility: CLINIC | Age: 71
End: 2021-06-11
Payer: MEDICARE

## 2021-06-11 VITALS
HEIGHT: 64 IN | DIASTOLIC BLOOD PRESSURE: 67 MMHG | BODY MASS INDEX: 35.85 KG/M2 | TEMPERATURE: 99 F | SYSTOLIC BLOOD PRESSURE: 134 MMHG | WEIGHT: 210 LBS

## 2021-06-11 DIAGNOSIS — H92.01 RIGHT EAR PAIN: Primary | ICD-10-CM

## 2021-06-11 PROCEDURE — 99213 OFFICE O/P EST LOW 20 MIN: CPT | Performed by: OTOLARYNGOLOGY

## 2021-06-11 RX ORDER — CYCLOBENZAPRINE HCL 5 MG
5 TABLET ORAL NIGHTLY
Qty: 30 TABLET | Refills: 1 | Status: SHIPPED | OUTPATIENT
Start: 2021-06-11

## 2021-06-11 RX ORDER — MELOXICAM 15 MG/1
15 TABLET ORAL DAILY
Qty: 30 TABLET | Refills: 3 | Status: SHIPPED | OUTPATIENT
Start: 2021-06-11

## 2021-06-11 NOTE — PROGRESS NOTES
Iqra Woodruff is a 65year old female. Patient presents with:  Ear Problem: c/o right ear pain for 3 days      HISTORY OF PRESENT ILLNESS  She presents with a history of decreased hearing which started about 6 months ago.  She's noted that her right ea sensation of water in her ear a few days ago on the right side which now has become full-fledged discomfort. She was in the ear hearing aids and does note that occasionally she wakes up clenching. She does have significant stressors at home at times.   No Negative Frequent skin infections, pigment change and rash. Hema/Lymph Negative Easy bleeding and easy bruising.            PHYSICAL EXAM    /67   Temp 98.5 °F (36.9 °C) (Tympanic)   Ht 5' 4\" (1.626 m)   Wt 210 lb (95.3 kg)   BMI 36.05 kg/m² Succinate ER 50 MG Oral Tablet 24 Hr, Take 50 mg by mouth daily. , Disp: , Rfl:   •  Calcium Carbonate-Vitamin D (CALCIUM 500 + D OR), Take 1 tablet by mouth daily. , Disp: , Rfl:   •  allopurinol 100 MG Oral Tab, Take 100 mg by mouth daily. , Disp: , Rfl:

## 2021-11-01 RX ORDER — DULOXETIN HYDROCHLORIDE 60 MG/1
CAPSULE, DELAYED RELEASE ORAL
Qty: 90 CAPSULE | Refills: 3 | Status: SHIPPED | OUTPATIENT
Start: 2021-11-01 | End: 2021-11-30

## 2021-11-01 NOTE — TELEPHONE ENCOUNTER
Medication request: DULOXETINE HCL 60 MG Oral Cap DR Particles  Sig:   TAKE 1 CAPSULE BY MOUTH EVERY DAY  Qty#90R-0    LOV: 2/4/20  NOV: 11/30/21    [place]7/28/21

## 2021-11-30 ENCOUNTER — OFFICE VISIT (OUTPATIENT)
Dept: PHYSICAL MEDICINE AND REHAB | Facility: CLINIC | Age: 71
End: 2021-11-30
Payer: MEDICARE

## 2021-11-30 VITALS
HEART RATE: 76 BPM | OXYGEN SATURATION: 96 % | HEIGHT: 64 IN | BODY MASS INDEX: 35.85 KG/M2 | SYSTOLIC BLOOD PRESSURE: 180 MMHG | DIASTOLIC BLOOD PRESSURE: 90 MMHG | WEIGHT: 210 LBS

## 2021-11-30 DIAGNOSIS — M47.812 CERVICAL SPONDYLOSIS: Primary | ICD-10-CM

## 2021-11-30 PROCEDURE — 99213 OFFICE O/P EST LOW 20 MIN: CPT | Performed by: PHYSICAL MEDICINE & REHABILITATION

## 2021-11-30 RX ORDER — DULOXETIN HYDROCHLORIDE 60 MG/1
60 CAPSULE, DELAYED RELEASE ORAL DAILY
Qty: 90 CAPSULE | Refills: 3 | Status: SHIPPED | OUTPATIENT
Start: 2021-11-30 | End: 2022-11-25

## 2021-11-30 NOTE — PROGRESS NOTES
[address]  Progress Note    CHIEF COMPLAINT:  Patient presents with:  Medication Follow-Up: LOV: 10/2/21 pt comes in to follow up for a medication refill.  pt is here for to see about having more duloxetine daily. 30 tablet 3   • NAPROXEN 500 MG Oral Tab TAKE 1 TABLET (500 MG TOTAL) BY MOUTH 2 (TWO) TIMES DAILY AS NEEDED (PAIN). 60 tablet 0   • Naproxen Sodium (ALEVE) 220 MG Oral Cap Take 220 mg by mouth 2 (two) times daily as needed.      • lisinopril 5 MG Or resumed it, she felt better. She will continue this indefinitely. I gave her 1 year of refills. She may alternatively obtain these medicines from Dr. Belen Hopson if it is more convenient for the patient. Return in 1 year or as needed.           The patient

## 2022-03-21 ENCOUNTER — HOSPITAL ENCOUNTER (OUTPATIENT)
Dept: MAMMOGRAPHY | Facility: HOSPITAL | Age: 72
Discharge: HOME | End: 2022-03-21
Attending: INTERNAL MEDICINE
Payer: MEDICARE

## 2022-03-21 DIAGNOSIS — Z12.31 SCREENING MAMMOGRAM, ENCOUNTER FOR: ICD-10-CM

## 2022-03-21 DIAGNOSIS — Z12.31 ENCOUNTER FOR SCREENING MAMMOGRAM FOR MALIGNANT NEOPLASM OF BREAST: ICD-10-CM

## 2022-03-21 PROCEDURE — 77067 SCR MAMMO BI INCL CAD: CPT | Performed by: INTERNAL MEDICINE

## 2022-03-21 PROCEDURE — 77063 BREAST TOMOSYNTHESIS BI: CPT | Performed by: INTERNAL MEDICINE

## 2022-04-05 ENCOUNTER — HOSPITAL ENCOUNTER (OUTPATIENT)
Dept: ULTRASOUND IMAGING | Facility: HOSPITAL | Age: 72
Discharge: HOME | End: 2022-04-05
Attending: INTERNAL MEDICINE
Payer: MEDICARE

## 2022-04-05 ENCOUNTER — HOSPITAL ENCOUNTER (OUTPATIENT)
Dept: MAMMOGRAPHY | Facility: HOSPITAL | Age: 72
Discharge: HOME | End: 2022-04-05
Attending: INTERNAL MEDICINE
Payer: MEDICARE

## 2022-04-05 DIAGNOSIS — R92.8 ABNORMAL MAMMOGRAM: ICD-10-CM

## 2022-04-05 PROCEDURE — 77065 DX MAMMO INCL CAD UNI: CPT | Performed by: INTERNAL MEDICINE

## 2022-04-05 PROCEDURE — 76642 ULTRASOUND BREAST LIMITED: CPT | Performed by: INTERNAL MEDICINE

## 2022-04-05 PROCEDURE — 77061 BREAST TOMOSYNTHESIS UNI: CPT | Performed by: INTERNAL MEDICINE

## 2022-05-17 NOTE — ADDENDUM NOTE
Addended by: Alayne Lombard on: 7/11/2019 12:47 PM     Modules accepted: Orders ERRONEOUS ENCOUNTER--DISREGARD.  Patient was roomed and had x-rays, unfortunately mom had to leave the office due to urgent need at home.  Mom was informed of prompt rescheduling with our team next week.  X-rays were performed and will remain in [facility] for following provider.    Raul Rodriguez DO [facility]  Primary Care Sports Medicine  [facility]

## 2022-08-25 ENCOUNTER — HOSPITAL ENCOUNTER (OUTPATIENT)
Dept: MAMMOGRAPHY | Facility: HOSPITAL | Age: 72
Discharge: HOME | End: 2022-08-25
Attending: NURSE PRACTITIONER
Payer: MEDICARE

## 2022-08-25 ENCOUNTER — HOSPITAL ENCOUNTER (OUTPATIENT)
Dept: ULTRASOUND IMAGING | Facility: HOSPITAL | Age: 72
Discharge: HOME | End: 2022-08-25
Attending: NURSE PRACTITIONER
Payer: MEDICARE

## 2022-08-25 DIAGNOSIS — R92.8 ABNORMAL MAMMOGRAM: ICD-10-CM

## 2022-08-25 PROCEDURE — 77061 BREAST TOMOSYNTHESIS UNI: CPT | Performed by: NURSE PRACTITIONER

## 2022-08-25 PROCEDURE — 76642 ULTRASOUND BREAST LIMITED: CPT | Performed by: NURSE PRACTITIONER

## 2022-08-25 PROCEDURE — 77065 DX MAMMO INCL CAD UNI: CPT | Performed by: NURSE PRACTITIONER

## 2022-09-01 ENCOUNTER — HOSPITAL ENCOUNTER (OUTPATIENT)
Dept: MAMMOGRAPHY | Facility: HOSPITAL | Age: 72
Discharge: HOME | End: 2022-09-01
Attending: NURSE PRACTITIONER
Payer: MEDICARE

## 2022-09-01 DIAGNOSIS — N63.10 BREAST MASS, RIGHT: ICD-10-CM

## 2022-09-01 DIAGNOSIS — R92.1 CALCIFICATION OF RIGHT BREAST: ICD-10-CM

## 2022-09-01 PROCEDURE — 19081 BX BREAST 1ST LESION STRTCTC: CPT | Performed by: NURSE PRACTITIONER

## 2022-09-01 PROCEDURE — 88305 TISSUE EXAM BY PATHOLOGIST: CPT | Performed by: INTERNAL MEDICINE

## 2022-09-01 NOTE — PROCEDURES
[facility]  Procedure Note    Levoalyssa Pilpaul Patient Status:  Outpatient    9/10/1950 MRN [id number]   Location [address] Attending Yanira Medrano,  [facility] Day # 0 PCP Eda Muñoz MD, MD     Procedure: Right breast stereotactic/robles guided biopsy    Pre-Procedure Diagnosis:  Right breast mass with associated calcifications    Post-Procedure Diagnosis: Right breast mass with associated calcifications    Anesthesia:  Local    Findings:  Right breast mass with associated calcifications    Specimens: multiple cores    Blood Loss:  minimal    Tourniquet Time: none  Complications:  None  Drains:  none    Efra Davis MD  2022

## 2022-09-02 ENCOUNTER — TELEPHONE (OUTPATIENT)
Dept: ULTRASOUND IMAGING | Facility: HOSPITAL | Age: 72
End: 2022-09-02

## 2022-09-16 ENCOUNTER — HOSPITAL ENCOUNTER (OUTPATIENT)
Dept: BONE DENSITY | Age: 72
Discharge: HOME | End: 2022-09-16
Attending: INTERNAL MEDICINE

## 2022-09-16 DIAGNOSIS — Z78.0 ASYMPTOMATIC POSTMENOPAUSAL STATUS: ICD-10-CM

## 2022-09-16 PROCEDURE — 77080 DXA BONE DENSITY AXIAL: CPT | Performed by: INTERNAL MEDICINE

## 2022-09-26 ENCOUNTER — LAB ENCOUNTER (OUTPATIENT)
Dept: LAB | Facility: HOSPITAL | Age: 72
End: 2022-09-26
Attending: INTERNAL MEDICINE

## 2022-09-26 DIAGNOSIS — E78.2 MIXED HYPERLIPIDEMIA: Primary | ICD-10-CM

## 2022-09-26 DIAGNOSIS — E11.9 TYPE 2 DIABETES MELLITUS (HCC): ICD-10-CM

## 2022-09-26 DIAGNOSIS — E55.9 VITAMIN D DEFICIENCY: ICD-10-CM

## 2022-09-26 LAB
ALBUMIN SERPL-MCNC: 3.8 G/DL (ref 3.4–5)
ALBUMIN/GLOB SERPL: 1.2 {RATIO} (ref 1–2)
ALP LIVER SERPL-CCNC: 55 U/L
ALT SERPL-CCNC: 37 U/L
ANION GAP SERPL CALC-SCNC: 7 MMOL/L (ref 0–18)
AST SERPL-CCNC: 20 U/L (ref 15–37)
BILIRUB SERPL-MCNC: 0.6 MG/DL (ref 0.1–2)
BUN BLD-MCNC: 23 MG/DL (ref 7–18)
BUN/CREAT SERPL: 27.4 (ref 10–20)
CALCIUM BLD-MCNC: 8.9 MG/DL (ref 8.5–10.1)
CHLORIDE SERPL-SCNC: 107 MMOL/L (ref 98–112)
CHOLEST SERPL-MCNC: 132 MG/DL (ref ?–200)
CO2 SERPL-SCNC: 27 MMOL/L (ref 21–32)
CREAT BLD-MCNC: 0.84 MG/DL
EST. AVERAGE GLUCOSE BLD GHB EST-MCNC: 137 MG/DL (ref 68–126)
FASTING PATIENT LIPID ANSWER: YES
FASTING STATUS PATIENT QL REPORTED: YES
GFR SERPLBLD BASED ON 1.73 SQ M-ARVRAT: 73 ML/MIN/1.73M2 (ref 60–?)
GLOBULIN PLAS-MCNC: 3.3 G/DL (ref 2.8–4.4)
GLUCOSE BLD-MCNC: 121 MG/DL (ref 70–99)
HBA1C MFR BLD: 6.4 % (ref ?–5.7)
HDLC SERPL-MCNC: 53 MG/DL (ref 40–59)
LDLC SERPL CALC-MCNC: 63 MG/DL (ref ?–100)
NONHDLC SERPL-MCNC: 79 MG/DL (ref ?–130)
OSMOLALITY SERPL CALC.SUM OF ELEC: 297 MOSM/KG (ref 275–295)
POTASSIUM SERPL-SCNC: 4.1 MMOL/L (ref 3.5–5.1)
PROT SERPL-MCNC: 7.1 G/DL (ref 6.4–8.2)
SODIUM SERPL-SCNC: 141 MMOL/L (ref 136–145)
TRIGL SERPL-MCNC: 83 MG/DL (ref 30–149)
VIT D+METAB SERPL-MCNC: 37.8 NG/ML (ref 30–100)
VLDLC SERPL CALC-MCNC: 12 MG/DL (ref 0–30)

## 2022-09-26 PROCEDURE — 80061 LIPID PANEL: CPT

## 2022-09-26 PROCEDURE — 36415 COLL VENOUS BLD VENIPUNCTURE: CPT

## 2022-09-26 PROCEDURE — 82306 VITAMIN D 25 HYDROXY: CPT

## 2022-09-26 PROCEDURE — 80053 COMPREHEN METABOLIC PANEL: CPT

## 2022-09-26 PROCEDURE — 83036 HEMOGLOBIN GLYCOSYLATED A1C: CPT

## 2023-01-07 ENCOUNTER — HOSPITAL ENCOUNTER (OUTPATIENT)
Age: 73
Discharge: HOME | End: 2023-01-07
Attending: EMERGENCY MEDICINE
Payer: MEDICARE

## 2023-01-07 VITALS
RESPIRATION RATE: 18 BRPM | SYSTOLIC BLOOD PRESSURE: 124 MMHG | TEMPERATURE: 98 F | HEART RATE: 77 BPM | DIASTOLIC BLOOD PRESSURE: 65 MMHG | OXYGEN SATURATION: 97 %

## 2023-01-07 DIAGNOSIS — U07.1 COVID-19 VIRUS INFECTION: Primary | ICD-10-CM

## 2023-01-07 LAB — SARS-COV-2 RNA RESP QL NAA+PROBE: DETECTED

## 2023-01-07 PROCEDURE — 99203 OFFICE O/P NEW LOW 30 MIN: CPT

## 2023-01-07 PROCEDURE — 99213 OFFICE O/P EST LOW 20 MIN: CPT

## 2023-01-07 NOTE — DISCHARGE INSTRUCTIONS
Quarantine for 5 days then wear mask if any symptoms  Return if shortness of breath  Tylenol for fever body aches

## 2023-03-23 ENCOUNTER — HOSPITAL ENCOUNTER (OUTPATIENT)
Dept: MAMMOGRAPHY | Facility: HOSPITAL | Age: 73
Discharge: HOME | End: 2023-03-23
Attending: INTERNAL MEDICINE
Payer: MEDICARE

## 2023-03-23 DIAGNOSIS — Z12.31 ENCOUNTER FOR SCREENING MAMMOGRAM FOR MALIGNANT NEOPLASM OF BREAST: ICD-10-CM

## 2023-03-23 PROCEDURE — 77067 SCR MAMMO BI INCL CAD: CPT | Performed by: INTERNAL MEDICINE

## 2023-03-23 PROCEDURE — 77063 BREAST TOMOSYNTHESIS BI: CPT | Performed by: INTERNAL MEDICINE

## 2023-05-19 NOTE — TELEPHONE ENCOUNTER
Left a detailed message for patient to call the office to schedule a follow up appointment. Last visit 11/30/2021    Medication request: Duloxetine 60 mg oral cap. Take 1 capsule by mouth daily. #90. 3 refills. LOV: 11/30/2021  Per last office note Return in about 1 year (around 11/30/2022). NOV: None.      [place] 2/16/2023 #90    Pain contract: N/A

## 2023-05-30 RX ORDER — DULOXETIN HYDROCHLORIDE 60 MG/1
CAPSULE, DELAYED RELEASE ORAL
Qty: 30 CAPSULE | Refills: 0 | Status: SHIPPED | OUTPATIENT
Start: 2023-05-30

## 2023-06-13 NOTE — PATIENT INSTRUCTIONS
Send me a message in 1 week or call in the office and let me know how you are doing. In the meantime I will discuss with Dr. Martínez Mcneill. Transposition Flap Text: The defect edges were debeveled with a #15 scalpel blade.  Given the location of the defect and the proximity to free margins a transposition flap was deemed most appropriate.  Using a sterile surgical marker, an appropriate transposition flap was drawn incorporating the defect.    The area thus outlined was incised deep to adipose tissue with a #15 scalpel blade.  The skin margins were undermined to an appropriate distance in all directions utilizing iris scissors.

## 2023-06-14 ENCOUNTER — OFFICE VISIT (OUTPATIENT)
Dept: PHYSICAL MEDICINE AND REHAB | Facility: CLINIC | Age: 73
End: 2023-06-14
Payer: MEDICARE

## 2023-06-14 VITALS
HEIGHT: 64 IN | BODY MASS INDEX: 35.85 KG/M2 | HEART RATE: 70 BPM | SYSTOLIC BLOOD PRESSURE: 120 MMHG | OXYGEN SATURATION: 96 % | WEIGHT: 210 LBS | DIASTOLIC BLOOD PRESSURE: 70 MMHG

## 2023-06-14 DIAGNOSIS — Z87.19 HISTORY OF ESOPHAGEAL REFLUX: ICD-10-CM

## 2023-06-14 DIAGNOSIS — M47.812 CERVICAL SPONDYLOSIS: Primary | ICD-10-CM

## 2023-06-14 PROCEDURE — 1111F DSCHRG MED/CURRENT MED MERGE: CPT | Performed by: PHYSICAL MEDICINE & REHABILITATION

## 2023-06-14 PROCEDURE — 99214 OFFICE O/P EST MOD 30 MIN: CPT | Performed by: PHYSICAL MEDICINE & REHABILITATION

## 2023-06-14 RX ORDER — DULOXETIN HYDROCHLORIDE 60 MG/1
60 CAPSULE, DELAYED RELEASE ORAL DAILY
Qty: 90 CAPSULE | Refills: 3 | Status: SHIPPED | OUTPATIENT
Start: 2023-06-14 | End: 2024-06-08

## 2023-07-27 PROBLEM — E11.9 TYPE 2 DIABETES MELLITUS WITHOUT COMPLICATION, WITHOUT LONG-TERM CURRENT USE OF INSULIN (HCC): Status: ACTIVE | Noted: 2022-10-10

## 2023-07-27 PROBLEM — M25.361 KNEE INSTABILITY, RIGHT: Status: ACTIVE | Noted: 2023-07-27

## 2023-07-27 PROBLEM — K86.2 PANCREAS CYST: Status: ACTIVE | Noted: 2021-01-20

## 2023-07-27 PROBLEM — K86.2 PANCREAS CYST (HCC): Status: ACTIVE | Noted: 2021-01-20

## 2023-07-27 PROBLEM — M23.91 INTERNAL DERANGEMENT OF RIGHT KNEE: Status: ACTIVE | Noted: 2023-07-27

## 2023-09-08 ENCOUNTER — HOSPITAL ENCOUNTER (OUTPATIENT)
Age: 73
Discharge: HOME | End: 2023-09-08
Attending: EMERGENCY MEDICINE
Payer: MEDICARE

## 2023-09-08 VITALS
DIASTOLIC BLOOD PRESSURE: 66 MMHG | TEMPERATURE: 99 F | HEART RATE: 73 BPM | RESPIRATION RATE: 16 BRPM | OXYGEN SATURATION: 98 % | SYSTOLIC BLOOD PRESSURE: 124 MMHG

## 2023-09-08 DIAGNOSIS — H00.022 HORDEOLUM INTERNUM OF RIGHT LOWER EYELID: Primary | ICD-10-CM

## 2023-09-08 PROCEDURE — 99213 OFFICE O/P EST LOW 20 MIN: CPT

## 2023-09-08 RX ORDER — OFLOXACIN 3 MG/ML
2 SOLUTION/ DROPS OPHTHALMIC 4 TIMES DAILY
Qty: 5 ML | Refills: 0 | Status: SHIPPED | OUTPATIENT
Start: 2023-09-08

## 2023-09-08 NOTE — ED INITIAL ASSESSMENT (HPI)
Patient arrives ambulatory with c/o right eye stye to the inside of her lower eyelid x 1 week. States area was tender and puffy. Normally wears contacts, but has been wearing glasses. States she has been using warm compresses to the area.

## 2024-03-11 ENCOUNTER — LAB ENCOUNTER (OUTPATIENT)
Dept: LAB | Facility: HOSPITAL | Age: 74
End: 2024-03-11
Attending: ORTHOPAEDIC SURGERY
Payer: MEDICARE

## 2024-03-11 DIAGNOSIS — Z01.818 PRE-OP TESTING: Primary | ICD-10-CM

## 2024-03-11 DIAGNOSIS — M17.11 PRIMARY OSTEOARTHRITIS OF RIGHT KNEE: ICD-10-CM

## 2024-03-11 LAB
ALBUMIN SERPL-MCNC: 4.4 G/DL (ref 3.2–4.8)
ALBUMIN/GLOB SERPL: 1.8 {RATIO} (ref 1–2)
ALP LIVER SERPL-CCNC: 56 U/L
ALT SERPL-CCNC: 38 U/L
ANION GAP SERPL CALC-SCNC: 6 MMOL/L (ref 0–18)
ANTIBODY SCREEN: NEGATIVE
AST SERPL-CCNC: 29 U/L (ref ?–34)
BASOPHILS # BLD AUTO: 0.03 X10(3) UL (ref 0–0.2)
BASOPHILS NFR BLD AUTO: 0.4 %
BILIRUB SERPL-MCNC: 0.4 MG/DL (ref 0.2–1.1)
BUN BLD-MCNC: 22 MG/DL (ref 9–23)
BUN/CREAT SERPL: 29.3 (ref 10–20)
CALCIUM BLD-MCNC: 10.1 MG/DL (ref 8.7–10.4)
CHLORIDE SERPL-SCNC: 107 MMOL/L (ref 98–112)
CO2 SERPL-SCNC: 28 MMOL/L (ref 21–32)
CREAT BLD-MCNC: 0.75 MG/DL
DEPRECATED RDW RBC AUTO: 43.9 FL (ref 35.1–46.3)
EGFRCR SERPLBLD CKD-EPI 2021: 83 ML/MIN/1.73M2 (ref 60–?)
EOSINOPHIL # BLD AUTO: 0.08 X10(3) UL (ref 0–0.7)
EOSINOPHIL NFR BLD AUTO: 1.1 %
ERYTHROCYTE [DISTWIDTH] IN BLOOD BY AUTOMATED COUNT: 12.3 % (ref 11–15)
FASTING STATUS PATIENT QL REPORTED: NO
GLOBULIN PLAS-MCNC: 2.4 G/DL (ref 2.8–4.4)
GLUCOSE BLD-MCNC: 102 MG/DL (ref 70–99)
HCT VFR BLD AUTO: 41.8 %
HGB BLD-MCNC: 13.7 G/DL
IMM GRANULOCYTES # BLD AUTO: 0.01 X10(3) UL (ref 0–1)
IMM GRANULOCYTES NFR BLD: 0.1 %
LYMPHOCYTES # BLD AUTO: 1.29 X10(3) UL (ref 1–4)
LYMPHOCYTES NFR BLD AUTO: 17.4 %
MCH RBC QN AUTO: 31.7 PG (ref 26–34)
MCHC RBC AUTO-ENTMCNC: 32.8 G/DL (ref 31–37)
MCV RBC AUTO: 96.8 FL
MONOCYTES # BLD AUTO: 0.48 X10(3) UL (ref 0.1–1)
MONOCYTES NFR BLD AUTO: 6.5 %
NEUTROPHILS # BLD AUTO: 5.53 X10 (3) UL (ref 1.5–7.7)
NEUTROPHILS # BLD AUTO: 5.53 X10(3) UL (ref 1.5–7.7)
NEUTROPHILS NFR BLD AUTO: 74.5 %
OSMOLALITY SERPL CALC.SUM OF ELEC: 296 MOSM/KG (ref 275–295)
PLATELET # BLD AUTO: 161 10(3)UL (ref 150–450)
POTASSIUM SERPL-SCNC: 4.5 MMOL/L (ref 3.5–5.1)
PROT SERPL-MCNC: 6.8 G/DL (ref 5.7–8.2)
RBC # BLD AUTO: 4.32 X10(6)UL
RH BLOOD TYPE: POSITIVE
RH BLOOD TYPE: POSITIVE
SODIUM SERPL-SCNC: 141 MMOL/L (ref 136–145)
WBC # BLD AUTO: 7.4 X10(3) UL (ref 4–11)

## 2024-03-11 PROCEDURE — 80053 COMPREHEN METABOLIC PANEL: CPT

## 2024-03-11 PROCEDURE — 86850 RBC ANTIBODY SCREEN: CPT

## 2024-03-11 PROCEDURE — 36415 COLL VENOUS BLD VENIPUNCTURE: CPT

## 2024-03-11 PROCEDURE — 87641 MR-STAPH DNA AMP PROBE: CPT

## 2024-03-11 PROCEDURE — 86900 BLOOD TYPING SEROLOGIC ABO: CPT

## 2024-03-11 PROCEDURE — 86901 BLOOD TYPING SEROLOGIC RH(D): CPT

## 2024-03-11 PROCEDURE — 85025 COMPLETE CBC W/AUTO DIFF WBC: CPT

## 2024-03-12 LAB — MRSA DNA SPEC QL NAA+PROBE: NEGATIVE

## 2024-03-22 ENCOUNTER — LAB ENCOUNTER (OUTPATIENT)
Dept: LAB | Facility: HOSPITAL | Age: 74
End: 2024-03-22
Attending: INTERNAL MEDICINE
Payer: MEDICARE

## 2024-03-22 DIAGNOSIS — E11.9 TYPE 2 DIABETES MELLITUS WITHOUT COMPLICATION, WITHOUT LONG-TERM CURRENT USE OF INSULIN (HCC): ICD-10-CM

## 2024-03-22 DIAGNOSIS — E78.2 MIXED HYPERLIPIDEMIA: Primary | ICD-10-CM

## 2024-03-22 LAB
CHOLEST SERPL-MCNC: 159 MG/DL (ref ?–200)
EST. AVERAGE GLUCOSE BLD GHB EST-MCNC: 120 MG/DL (ref 68–126)
FASTING PATIENT LIPID ANSWER: YES
HBA1C MFR BLD: 5.8 % (ref ?–5.7)
HDLC SERPL-MCNC: 53 MG/DL (ref 40–59)
LDLC SERPL CALC-MCNC: 93 MG/DL (ref ?–100)
NONHDLC SERPL-MCNC: 106 MG/DL (ref ?–130)
TRIGL SERPL-MCNC: 65 MG/DL (ref 30–149)
VLDLC SERPL CALC-MCNC: 11 MG/DL (ref 0–30)

## 2024-03-22 PROCEDURE — 83036 HEMOGLOBIN GLYCOSYLATED A1C: CPT

## 2024-03-22 PROCEDURE — 80061 LIPID PANEL: CPT

## 2024-03-22 PROCEDURE — 36415 COLL VENOUS BLD VENIPUNCTURE: CPT

## 2024-03-27 NOTE — DISCHARGE INSTRUCTIONS
The patient will call for an appointment in the next 2 weeks for follow-up.    The patient has been instructed on wound care and may shower if wound is clean and dry.  Perform dry dressing change daily.  Do NOT remove Prineo mesh adhesive overlying surgical incision.  If the wound has drainage then dry dressing changes should be performed daily until the wound is dry.  The patient has been instructed to contact the office if increasing drainage, redness, or swelling to the operative wound/extremity occurs.  Similarly, if they develop fevers and chills they should call the office ASAP.    The patient will continue to wear JOSEFA hose to both legs for 3 weeks.  They may remove them at night or if they are causing skin irritation.  Prescribed DVT prophylaxis should be taken for 2-3 weeks after discharge (as written on prescription).  Oral pain medications have been provided and instruction on administration given to the patient.  If there are any questions or increasing or uncontrolled pain, the patient should call the office [phone].    The patient will resume a normal diet.  They should anticipate a slight decrease in appetite after surgery, but should notify the office if there are any problems with nausea, vomiting, constipation or diarrhea.  A stool softner has been ordered and should be taken regularly while on pain medications.

## 2024-04-03 ENCOUNTER — HOSPITAL ENCOUNTER (OUTPATIENT)
Facility: HOSPITAL | Age: 74
Discharge: HOME HEALTH | End: 2024-04-04
Attending: ORTHOPAEDIC SURGERY | Admitting: ORTHOPAEDIC SURGERY
Payer: MEDICARE

## 2024-04-03 ENCOUNTER — ANESTHESIA (OUTPATIENT)
Dept: SURGERY | Facility: HOSPITAL | Age: 74
End: 2024-04-03
Payer: MEDICARE

## 2024-04-03 ENCOUNTER — ANESTHESIA EVENT (OUTPATIENT)
Dept: SURGERY | Facility: HOSPITAL | Age: 74
End: 2024-04-03
Payer: MEDICARE

## 2024-04-03 ENCOUNTER — APPOINTMENT (OUTPATIENT)
Dept: GENERAL RADIOLOGY | Facility: HOSPITAL | Age: 74
End: 2024-04-03
Payer: MEDICARE

## 2024-04-03 PROBLEM — E11.9 TYPE 2 DIABETES MELLITUS WITHOUT COMPLICATION, WITHOUT LONG-TERM CURRENT USE OF INSULIN (HCC): Status: RESOLVED | Noted: 2022-10-10 | Resolved: 2024-04-03

## 2024-04-03 PROBLEM — E78.5 HYPERLIPIDEMIA: Status: ACTIVE | Noted: 2024-04-03

## 2024-04-03 PROBLEM — R73.03 PRE-DIABETES: Status: ACTIVE | Noted: 2024-04-03

## 2024-04-03 PROBLEM — I10 ESSENTIAL HYPERTENSION: Status: ACTIVE | Noted: 2024-04-03

## 2024-04-03 PROCEDURE — 0SRC069 REPLACEMENT OF RIGHT KNEE JOINT WITH OXIDIZED ZIRCONIUM ON POLYETHYLENE SYNTHETIC SUBSTITUTE, CEMENTED, OPEN APPROACH: ICD-10-PCS | Performed by: ORTHOPAEDIC SURGERY

## 2024-04-03 PROCEDURE — 99204 OFFICE O/P NEW MOD 45 MIN: CPT | Performed by: HOSPITALIST

## 2024-04-03 PROCEDURE — 73560 X-RAY EXAM OF KNEE 1 OR 2: CPT

## 2024-04-03 DEVICE — IMPLANTABLE DEVICE
Type: IMPLANTABLE DEVICE | Site: KNEE | Status: FUNCTIONAL
Brand: NEXGEN®

## 2024-04-03 DEVICE — IMPLANTABLE DEVICE
Type: IMPLANTABLE DEVICE | Site: KNEE | Status: FUNCTIONAL
Brand: BIOMET® BONE CEMENT R

## 2024-04-03 RX ORDER — ONDANSETRON 2 MG/ML
4 INJECTION INTRAMUSCULAR; INTRAVENOUS ONCE AS NEEDED
Status: ACTIVE | OUTPATIENT
Start: 2024-04-03 | End: 2024-04-03

## 2024-04-03 RX ORDER — TRAMADOL HYDROCHLORIDE 50 MG/1
50 TABLET ORAL EVERY 6 HOURS PRN
Status: DISCONTINUED | OUTPATIENT
Start: 2024-04-03 | End: 2024-04-04

## 2024-04-03 RX ORDER — DIPHENHYDRAMINE HYDROCHLORIDE 50 MG/ML
12.5 INJECTION INTRAMUSCULAR; INTRAVENOUS EVERY 4 HOURS PRN
Status: DISCONTINUED | OUTPATIENT
Start: 2024-04-03 | End: 2024-04-04

## 2024-04-03 RX ORDER — METOCLOPRAMIDE HYDROCHLORIDE 5 MG/ML
10 INJECTION INTRAMUSCULAR; INTRAVENOUS EVERY 8 HOURS PRN
Status: DISCONTINUED | OUTPATIENT
Start: 2024-04-03 | End: 2024-04-04

## 2024-04-03 RX ORDER — MORPHINE SULFATE 1 MG/ML
INJECTION, SOLUTION EPIDURAL; INTRATHECAL; INTRAVENOUS
Status: COMPLETED | OUTPATIENT
Start: 2024-04-03 | End: 2024-04-03

## 2024-04-03 RX ORDER — METOPROLOL TARTRATE 1 MG/ML
2.5 INJECTION, SOLUTION INTRAVENOUS ONCE
Status: DISCONTINUED | OUTPATIENT
Start: 2024-04-03 | End: 2024-04-03 | Stop reason: HOSPADM

## 2024-04-03 RX ORDER — NALBUPHINE HYDROCHLORIDE 10 MG/ML
2.5 INJECTION, SOLUTION INTRAMUSCULAR; INTRAVENOUS; SUBCUTANEOUS EVERY 4 HOURS PRN
Status: DISCONTINUED | OUTPATIENT
Start: 2024-04-03 | End: 2024-04-04

## 2024-04-03 RX ORDER — NICOTINE POLACRILEX 4 MG
15 LOZENGE BUCCAL
Status: DISCONTINUED | OUTPATIENT
Start: 2024-04-03 | End: 2024-04-03 | Stop reason: HOSPADM

## 2024-04-03 RX ORDER — PROCHLORPERAZINE EDISYLATE 5 MG/ML
5 INJECTION INTRAMUSCULAR; INTRAVENOUS ONCE AS NEEDED
Status: ACTIVE | OUTPATIENT
Start: 2024-04-03 | End: 2024-04-03

## 2024-04-03 RX ORDER — ASPIRIN 81 MG/1
81 TABLET ORAL 2 TIMES DAILY
Status: DISCONTINUED | OUTPATIENT
Start: 2024-04-03 | End: 2024-04-04

## 2024-04-03 RX ORDER — DIPHENHYDRAMINE HCL 25 MG
25 CAPSULE ORAL EVERY 4 HOURS PRN
Status: DISCONTINUED | OUTPATIENT
Start: 2024-04-03 | End: 2024-04-04

## 2024-04-03 RX ORDER — ONDANSETRON 2 MG/ML
4 INJECTION INTRAMUSCULAR; INTRAVENOUS EVERY 6 HOURS PRN
Status: DISCONTINUED | OUTPATIENT
Start: 2024-04-03 | End: 2024-04-03 | Stop reason: HOSPADM

## 2024-04-03 RX ORDER — HYDROMORPHONE HYDROCHLORIDE 1 MG/ML
0.4 INJECTION, SOLUTION INTRAMUSCULAR; INTRAVENOUS; SUBCUTANEOUS
Status: DISCONTINUED | OUTPATIENT
Start: 2024-04-03 | End: 2024-04-04

## 2024-04-03 RX ORDER — CYCLOBENZAPRINE HCL 10 MG
10 TABLET ORAL EVERY 8 HOURS PRN
Status: DISCONTINUED | OUTPATIENT
Start: 2024-04-03 | End: 2024-04-04

## 2024-04-03 RX ORDER — MORPHINE SULFATE 4 MG/ML
4 INJECTION, SOLUTION INTRAMUSCULAR; INTRAVENOUS EVERY 10 MIN PRN
Status: DISCONTINUED | OUTPATIENT
Start: 2024-04-03 | End: 2024-04-03 | Stop reason: HOSPADM

## 2024-04-03 RX ORDER — HYDROMORPHONE HYDROCHLORIDE 1 MG/ML
0.6 INJECTION, SOLUTION INTRAMUSCULAR; INTRAVENOUS; SUBCUTANEOUS EVERY 5 MIN PRN
Status: DISCONTINUED | OUTPATIENT
Start: 2024-04-03 | End: 2024-04-03 | Stop reason: HOSPADM

## 2024-04-03 RX ORDER — DOCUSATE SODIUM 100 MG/1
100 CAPSULE, LIQUID FILLED ORAL 2 TIMES DAILY
Status: DISCONTINUED | OUTPATIENT
Start: 2024-04-03 | End: 2024-04-04

## 2024-04-03 RX ORDER — DEXTROSE MONOHYDRATE 25 G/50ML
50 INJECTION, SOLUTION INTRAVENOUS
Status: CANCELLED | OUTPATIENT
Start: 2024-04-03

## 2024-04-03 RX ORDER — NALOXONE HYDROCHLORIDE 0.4 MG/ML
80 INJECTION, SOLUTION INTRAMUSCULAR; INTRAVENOUS; SUBCUTANEOUS AS NEEDED
Status: DISCONTINUED | OUTPATIENT
Start: 2024-04-03 | End: 2024-04-03 | Stop reason: HOSPADM

## 2024-04-03 RX ORDER — EPHEDRINE SULFATE 50 MG/ML
INJECTION INTRAVENOUS AS NEEDED
Status: DISCONTINUED | OUTPATIENT
Start: 2024-04-03 | End: 2024-04-03 | Stop reason: SURG

## 2024-04-03 RX ORDER — SODIUM CHLORIDE, SODIUM LACTATE, POTASSIUM CHLORIDE, CALCIUM CHLORIDE 600; 310; 30; 20 MG/100ML; MG/100ML; MG/100ML; MG/100ML
INJECTION, SOLUTION INTRAVENOUS CONTINUOUS
Status: DISCONTINUED | OUTPATIENT
Start: 2024-04-03 | End: 2024-04-03 | Stop reason: HOSPADM

## 2024-04-03 RX ORDER — HYDROMORPHONE HYDROCHLORIDE 1 MG/ML
0.5 INJECTION, SOLUTION INTRAMUSCULAR; INTRAVENOUS; SUBCUTANEOUS EVERY 4 HOURS PRN
Status: DISCONTINUED | OUTPATIENT
Start: 2024-04-03 | End: 2024-04-04

## 2024-04-03 RX ORDER — SODIUM CHLORIDE, SODIUM LACTATE, POTASSIUM CHLORIDE, CALCIUM CHLORIDE 600; 310; 30; 20 MG/100ML; MG/100ML; MG/100ML; MG/100ML
INJECTION, SOLUTION INTRAVENOUS CONTINUOUS
Status: DISCONTINUED | OUTPATIENT
Start: 2024-04-03 | End: 2024-04-04

## 2024-04-03 RX ORDER — CEFAZOLIN SODIUM/WATER 2 G/20 ML
2 SYRINGE (ML) INTRAVENOUS EVERY 8 HOURS
Status: DISCONTINUED | OUTPATIENT
Start: 2024-04-03 | End: 2024-04-03

## 2024-04-03 RX ORDER — HYDROMORPHONE HYDROCHLORIDE 1 MG/ML
0.4 INJECTION, SOLUTION INTRAMUSCULAR; INTRAVENOUS; SUBCUTANEOUS EVERY 5 MIN PRN
Status: DISCONTINUED | OUTPATIENT
Start: 2024-04-03 | End: 2024-04-03 | Stop reason: HOSPADM

## 2024-04-03 RX ORDER — HYDROMORPHONE HYDROCHLORIDE 1 MG/ML
0.6 INJECTION, SOLUTION INTRAMUSCULAR; INTRAVENOUS; SUBCUTANEOUS
Status: DISCONTINUED | OUTPATIENT
Start: 2024-04-03 | End: 2024-04-04

## 2024-04-03 RX ORDER — FAMOTIDINE 10 MG/ML
20 INJECTION, SOLUTION INTRAVENOUS 2 TIMES DAILY
Status: DISCONTINUED | OUTPATIENT
Start: 2024-04-03 | End: 2024-04-04

## 2024-04-03 RX ORDER — ONDANSETRON 2 MG/ML
4 INJECTION INTRAMUSCULAR; INTRAVENOUS EVERY 6 HOURS PRN
Status: DISCONTINUED | OUTPATIENT
Start: 2024-04-03 | End: 2024-04-04

## 2024-04-03 RX ORDER — MORPHINE SULFATE 10 MG/ML
6 INJECTION, SOLUTION INTRAMUSCULAR; INTRAVENOUS EVERY 10 MIN PRN
Status: DISCONTINUED | OUTPATIENT
Start: 2024-04-03 | End: 2024-04-03 | Stop reason: HOSPADM

## 2024-04-03 RX ORDER — TRANEXAMIC ACID 650 MG/1
1300 TABLET ORAL ONCE
Status: COMPLETED | OUTPATIENT
Start: 2024-04-03 | End: 2024-04-03

## 2024-04-03 RX ORDER — HYDROMORPHONE HYDROCHLORIDE 1 MG/ML
0.2 INJECTION, SOLUTION INTRAMUSCULAR; INTRAVENOUS; SUBCUTANEOUS EVERY 5 MIN PRN
Status: DISCONTINUED | OUTPATIENT
Start: 2024-04-03 | End: 2024-04-03 | Stop reason: HOSPADM

## 2024-04-03 RX ORDER — HYDROCODONE BITARTRATE AND ACETAMINOPHEN 7.5; 325 MG/1; MG/1
2 TABLET ORAL EVERY 6 HOURS PRN
Status: DISCONTINUED | OUTPATIENT
Start: 2024-04-03 | End: 2024-04-04

## 2024-04-03 RX ORDER — LISINOPRIL 5 MG/1
5 TABLET ORAL DAILY
Status: DISCONTINUED | OUTPATIENT
Start: 2024-04-04 | End: 2024-04-04

## 2024-04-03 RX ORDER — ENEMA 19; 7 G/133ML; G/133ML
1 ENEMA RECTAL ONCE AS NEEDED
Status: DISCONTINUED | OUTPATIENT
Start: 2024-04-03 | End: 2024-04-04

## 2024-04-03 RX ORDER — ALLOPURINOL 100 MG/1
200 TABLET ORAL DAILY
Status: DISCONTINUED | OUTPATIENT
Start: 2024-04-04 | End: 2024-04-04

## 2024-04-03 RX ORDER — HYDROCODONE BITARTRATE AND ACETAMINOPHEN 5; 325 MG/1; MG/1
1 TABLET ORAL EVERY 4 HOURS PRN
Status: DISCONTINUED | OUTPATIENT
Start: 2024-04-03 | End: 2024-04-04

## 2024-04-03 RX ORDER — FUROSEMIDE 20 MG/1
20 TABLET ORAL DAILY
Status: DISCONTINUED | OUTPATIENT
Start: 2024-04-04 | End: 2024-04-04

## 2024-04-03 RX ORDER — ACETAMINOPHEN 325 MG/1
650 TABLET ORAL EVERY 6 HOURS PRN
Status: DISCONTINUED | OUTPATIENT
Start: 2024-04-03 | End: 2024-04-04

## 2024-04-03 RX ORDER — HYDROCODONE BITARTRATE AND ACETAMINOPHEN 10; 325 MG/1; MG/1
1 TABLET ORAL EVERY 4 HOURS PRN
Status: DISCONTINUED | OUTPATIENT
Start: 2024-04-03 | End: 2024-04-04

## 2024-04-03 RX ORDER — BISACODYL 10 MG
10 SUPPOSITORY, RECTAL RECTAL
Status: DISCONTINUED | OUTPATIENT
Start: 2024-04-03 | End: 2024-04-04

## 2024-04-03 RX ORDER — CEFAZOLIN SODIUM/WATER 2 G/20 ML
2 SYRINGE (ML) INTRAVENOUS EVERY 8 HOURS
Status: COMPLETED | OUTPATIENT
Start: 2024-04-03 | End: 2024-04-04

## 2024-04-03 RX ORDER — DEXTROSE MONOHYDRATE 25 G/50ML
50 INJECTION, SOLUTION INTRAVENOUS
Status: DISCONTINUED | OUTPATIENT
Start: 2024-04-03 | End: 2024-04-03 | Stop reason: HOSPADM

## 2024-04-03 RX ORDER — MIDAZOLAM HYDROCHLORIDE 1 MG/ML
INJECTION INTRAMUSCULAR; INTRAVENOUS AS NEEDED
Status: DISCONTINUED | OUTPATIENT
Start: 2024-04-03 | End: 2024-04-03 | Stop reason: SURG

## 2024-04-03 RX ORDER — POLYETHYLENE GLYCOL 3350 17 G/17G
17 POWDER, FOR SOLUTION ORAL DAILY PRN
Status: DISCONTINUED | OUTPATIENT
Start: 2024-04-03 | End: 2024-04-04

## 2024-04-03 RX ORDER — HALOPERIDOL 5 MG/ML
0.5 INJECTION INTRAMUSCULAR ONCE AS NEEDED
Status: ACTIVE | OUTPATIENT
Start: 2024-04-03 | End: 2024-04-03

## 2024-04-03 RX ORDER — NICOTINE POLACRILEX 4 MG
30 LOZENGE BUCCAL
Status: CANCELLED | OUTPATIENT
Start: 2024-04-03

## 2024-04-03 RX ORDER — ACETAMINOPHEN 500 MG
1000 TABLET ORAL ONCE
Status: COMPLETED | OUTPATIENT
Start: 2024-04-03 | End: 2024-04-03

## 2024-04-03 RX ORDER — METOCLOPRAMIDE HYDROCHLORIDE 5 MG/ML
10 INJECTION INTRAMUSCULAR; INTRAVENOUS EVERY 8 HOURS PRN
Status: DISCONTINUED | OUTPATIENT
Start: 2024-04-03 | End: 2024-04-03 | Stop reason: HOSPADM

## 2024-04-03 RX ORDER — ACETAMINOPHEN 500 MG
1000 TABLET ORAL EVERY 8 HOURS PRN
Status: DISCONTINUED | OUTPATIENT
Start: 2024-04-03 | End: 2024-04-04

## 2024-04-03 RX ORDER — BUPIVACAINE HYDROCHLORIDE 7.5 MG/ML
INJECTION, SOLUTION INTRASPINAL
Status: COMPLETED | OUTPATIENT
Start: 2024-04-03 | End: 2024-04-03

## 2024-04-03 RX ORDER — NALOXONE HYDROCHLORIDE 0.4 MG/ML
0.08 INJECTION, SOLUTION INTRAMUSCULAR; INTRAVENOUS; SUBCUTANEOUS
Status: DISCONTINUED | OUTPATIENT
Start: 2024-04-03 | End: 2024-04-04

## 2024-04-03 RX ORDER — SENNOSIDES 8.6 MG
17.2 TABLET ORAL NIGHTLY
Status: DISCONTINUED | OUTPATIENT
Start: 2024-04-03 | End: 2024-04-04

## 2024-04-03 RX ORDER — METOPROLOL SUCCINATE 50 MG/1
50 TABLET, EXTENDED RELEASE ORAL DAILY
Status: DISCONTINUED | OUTPATIENT
Start: 2024-04-04 | End: 2024-04-04

## 2024-04-03 RX ORDER — FAMOTIDINE 10 MG/ML
20 INJECTION, SOLUTION INTRAVENOUS ONCE
Status: COMPLETED | OUTPATIENT
Start: 2024-04-03 | End: 2024-04-03

## 2024-04-03 RX ORDER — FAMOTIDINE 20 MG/1
20 TABLET, FILM COATED ORAL ONCE
Status: COMPLETED | OUTPATIENT
Start: 2024-04-03 | End: 2024-04-03

## 2024-04-03 RX ORDER — DULOXETIN HYDROCHLORIDE 60 MG/1
60 CAPSULE, DELAYED RELEASE ORAL EVERY MORNING
Status: DISCONTINUED | OUTPATIENT
Start: 2024-04-04 | End: 2024-04-04

## 2024-04-03 RX ORDER — HYDROCODONE BITARTRATE AND ACETAMINOPHEN 7.5; 325 MG/1; MG/1
1 TABLET ORAL EVERY 6 HOURS PRN
Status: DISCONTINUED | OUTPATIENT
Start: 2024-04-03 | End: 2024-04-04

## 2024-04-03 RX ORDER — FAMOTIDINE 20 MG/1
20 TABLET, FILM COATED ORAL 2 TIMES DAILY
Status: DISCONTINUED | OUTPATIENT
Start: 2024-04-03 | End: 2024-04-04

## 2024-04-03 RX ORDER — DIPHENHYDRAMINE HYDROCHLORIDE 50 MG/ML
25 INJECTION INTRAMUSCULAR; INTRAVENOUS ONCE AS NEEDED
Status: ACTIVE | OUTPATIENT
Start: 2024-04-03 | End: 2024-04-03

## 2024-04-03 RX ORDER — NICOTINE POLACRILEX 4 MG
15 LOZENGE BUCCAL
Status: CANCELLED | OUTPATIENT
Start: 2024-04-03

## 2024-04-03 RX ORDER — MORPHINE SULFATE 4 MG/ML
2 INJECTION, SOLUTION INTRAMUSCULAR; INTRAVENOUS EVERY 10 MIN PRN
Status: DISCONTINUED | OUTPATIENT
Start: 2024-04-03 | End: 2024-04-03 | Stop reason: HOSPADM

## 2024-04-03 RX ORDER — ATORVASTATIN CALCIUM 10 MG/1
10 TABLET, FILM COATED ORAL EVERY MORNING
Status: DISCONTINUED | OUTPATIENT
Start: 2024-04-03 | End: 2024-04-04

## 2024-04-03 RX ORDER — NICOTINE POLACRILEX 4 MG
30 LOZENGE BUCCAL
Status: DISCONTINUED | OUTPATIENT
Start: 2024-04-03 | End: 2024-04-03 | Stop reason: HOSPADM

## 2024-04-03 RX ORDER — CEFAZOLIN SODIUM/WATER 2 G/20 ML
2 SYRINGE (ML) INTRAVENOUS ONCE
Status: COMPLETED | OUTPATIENT
Start: 2024-04-03 | End: 2024-04-03

## 2024-04-03 RX ORDER — BUPIVACAINE HYDROCHLORIDE 2.5 MG/ML
INJECTION, SOLUTION EPIDURAL; INFILTRATION; INTRACAUDAL AS NEEDED
Status: DISCONTINUED | OUTPATIENT
Start: 2024-04-03 | End: 2024-04-03 | Stop reason: HOSPADM

## 2024-04-03 RX ADMIN — SODIUM CHLORIDE, SODIUM LACTATE, POTASSIUM CHLORIDE, CALCIUM CHLORIDE: 600; 310; 30; 20 INJECTION, SOLUTION INTRAVENOUS at 12:33:00

## 2024-04-03 RX ADMIN — SODIUM CHLORIDE, SODIUM LACTATE, POTASSIUM CHLORIDE, CALCIUM CHLORIDE: 600; 310; 30; 20 INJECTION, SOLUTION INTRAVENOUS at 12:00:00

## 2024-04-03 RX ADMIN — MIDAZOLAM HYDROCHLORIDE 2 MG: 1 INJECTION INTRAMUSCULAR; INTRAVENOUS at 11:06:00

## 2024-04-03 RX ADMIN — CEFAZOLIN SODIUM/WATER 2 G: 2 G/20 ML SYRINGE (ML) INTRAVENOUS at 10:47:00

## 2024-04-03 RX ADMIN — EPHEDRINE SULFATE 10 MG: 50 INJECTION INTRAVENOUS at 10:58:00

## 2024-04-03 RX ADMIN — BUPIVACAINE HYDROCHLORIDE 1.6 ML: 7.5 INJECTION, SOLUTION INTRASPINAL at 11:06:00

## 2024-04-03 RX ADMIN — EPHEDRINE SULFATE 10 MG: 50 INJECTION INTRAVENOUS at 11:54:00

## 2024-04-03 RX ADMIN — EPHEDRINE SULFATE 10 MG: 50 INJECTION INTRAVENOUS at 11:02:00

## 2024-04-03 RX ADMIN — MORPHINE SULFATE 0.3 MG: 1 INJECTION, SOLUTION EPIDURAL; INTRATHECAL; INTRAVENOUS at 11:06:00

## 2024-04-03 NOTE — ANESTHESIA PREPROCEDURE EVALUATION
Anesthesia PreOp Note    HPI:     Arina Munoz is a 73 year old female who presents for preoperative consultation requested by: Villa Hamilton MD    Date of Surgery: 4/3/2024    Procedure(s):  Right total knee arthroplasty  Indication: Right knee degenerative joint disease    Relevant Problems   No relevant active problems       NPO:  Last Liquid Consumption Date: 04/02/24  Last Liquid Consumption Time: 1900  Last Solid Consumption Date: 04/02/24  Last Solid Consumption Time: 1900  Last Liquid Consumption Date: 04/02/24          History Review:  Patient Active Problem List    Diagnosis Date Noted    Internal derangement of right knee 07/27/2023    Knee instability, right 07/27/2023    Type 2 diabetes mellitus without complication, without long-term current use of insulin ([facility]) 10/10/2022    Pancreas cyst ([facility]) 01/20/2021    Arthropathy of cervical facet joint 10/29/2019    Cervical spondylosis 09/15/2019    Insomnia 07/08/2019    Myofascial pain 07/08/2019    Cervicalgia of btofafud-etxntsy-mwzay region 06/29/2019    History of esophageal reflux 06/29/2019    Bursitis of right hip 04/04/2019    Tear of rotator cuff 04/04/2019    Sensorineural hearing loss, bilateral 12/29/2016    Gout 05/16/2016    Knee osteoarthritis 04/22/2013    Carpal tunnel syndrome 05/03/2005       Past Medical History:   Diagnosis Date    Esophageal reflux     Essential hypertension     Gout     Hearing impairment     Bilateral hearing aides    Hyperlipidemia     Prediabetes     Visual impairment     Glasses and Contacts       Past Surgical History:   Procedure Laterality Date    CARPAL TUNNEL RELEASE Bilateral     COLONOSCOPY  09/2019    COLONOSCOPY N/A 09/19/2019    Procedure: COLONOSCOPY, POSSIBLE BIOPSY, POSSIBLE POLYPECTOMY 12429;  Surgeon: Tanmay Klein MD;  Location: [facility], [facility]    EXCIS LUMBAR DISK,ONE LEVEL      KNEE REPLACEMENT SURGERY Left 2011    [facility] BIOPSY STEREO NODULE 1 SITE RIGHT (CPT=19081)       9-1-2022    [facility] LOCALIZATION WIRE 1 SITE LEFT (CPT=19281)      benign       Medications Prior to Admission   Medication Sig Dispense Refill Last Dose    Cholecalciferol (VITAMIN D HIGH POTENCY OR) Take by mouth daily.   3/26/2024    DULoxetine 60 MG Oral Cap DR Particles Take 1 capsule (60 mg total) by mouth daily. (Patient taking differently: Take 1 capsule (60 mg total) by mouth every morning.) 90 capsule 3 4/3/2024 at 0800    lisinopril 5 MG Oral Tab Take 1 tablet (5 mg total) by mouth daily.   4/2/2024 at 0800    Metoprolol Succinate ER 50 MG Oral Tablet 24 Hr Take 1 tablet (50 mg total) by mouth daily.   4/3/2024 at 0800    Calcium Carbonate-Vitamin D (CALCIUM 500 + D OR) Take 1 tablet by mouth daily.   3/26/2024    allopurinol 100 MG Oral Tab Take 2 tablets (200 mg total) by mouth daily.   4/2/2024 at 0800    furosemide 20 MG Oral Tab Take 1 tablet (20 mg total) by mouth daily.   4/2/2024 at 0800    Atorvastatin Calcium 10 MG Oral Tab Take 1 tablet (10 mg total) by mouth every morning.   4/2/2024 at 0800    Meloxicam (MOBIC) 15 MG Oral Tab Take 1 tablet (15 mg total) by mouth daily with food. (Patient not taking: Reported on 8/17/2023) 40 tablet 3 More than a month    Naproxen Sodium (ALEVE) 220 MG Oral Cap Take 220 mg by mouth 2 (two) times daily as needed. (Patient not taking: Reported on 8/17/2023)   More than a month     Current Facility-Administered Medications Ordered in Epic   Medication Dose Route Frequency Provider Last Rate Last Admin    ceFAZolin (Ancef) 2 g in 20mL IV syringe premix  2 g Intravenous Once Michelle Quinones PA        lactated ringers infusion   Intravenous Continuous Villa Hamilton MD 20 mL/hr at 04/03/24 0859 New Bag at 04/03/24 0859    metoprolol tartrate (Lopressor) tab 25 mg  25 mg Oral Once PRN Villa Hamilton MD         No current [facility]-ordered outpatient medications on file.       Allergies   Allergen Reactions    Sulfa Antibiotics HIVES       Family History   Problem Relation  Age of Onset    Cancer Father      Social History     Socioeconomic History    Marital status:    Tobacco Use    Smoking status: Former     Types: Cigarettes     Quit date:      Years since quittin.2    Smokeless tobacco: Never   Vaping Use    Vaping Use: Never used   Substance and Sexual Activity    Alcohol use: Yes     Comment: Occasionally    Drug use: No   Other Topics Concern    Caffeine Concern Yes     Comment: Coffee: 2 cups daily    Exercise Yes       Available pre-op labs reviewed.  Lab Results   Component Value Date    WBC 7.4 2024    RBC 4.32 2024    HGB 13.7 2024    HCT 41.8 2024    MCV 96.8 2024    MCH 31.7 2024    MCHC 32.8 2024    RDW 12.3 2024    .0 2024     Lab Results   Component Value Date     2024    K 4.5 2024     2024    CO2 28.0 2024    BUN 22 2024    CREATSERUM 0.75 2024     (H) 2024    CA 10.1 2024          Vital Signs:  Body mass index is 32.77 kg/m².   height is 1.6 m (5' 3\") and weight is 83.9 kg (185 lb). Her oral temperature is 98.4 °F (36.9 °C). Her blood pressure is 128/52 and her pulse is 63. Her respiration is 18 and oxygen saturation is 97%.   Vitals:    24 1021 24 0854   BP:  128/52   Pulse:  63   Resp:  18   Temp:  98.4 °F (36.9 °C)   TempSrc:  Oral   SpO2:  97%   Weight: 83 kg (183 lb) 83.9 kg (185 lb)   Height: 1.6 m (5' 3\") 1.6 m (5' 3\")        Anesthesia Evaluation     Patient summary reviewed and Nursing notes reviewed    Airway   Mallampati: II  Dental      Pulmonary - negative ROS and normal exam   Cardiovascular - normal exam  Exercise tolerance: good    NYHA Classification: I    Neuro/Psych - negative ROS     GI/Hepatic/Renal - negative ROS     Endo/Other - negative ROS   Abdominal                  Anesthesia Plan:   ASA:  2  Plan:   Spinal  Post-op Pain Management: Intrathecal narcotics      I have informed Arina  A Bakula and/or legal guardian or family member of the nature of the anesthetic plan, benefits, risks including possible dental damage if relevant, major complications, and any alternative forms of anesthetic management.   All of the patient's questions were answered to the best of my ability. The patient desires the anesthetic management as planned.  TONIA JC MD  4/3/2024 10:24 AM  Present on Admission:  **None**

## 2024-04-03 NOTE — OPERATIVE REPORT
[facility]  part of [facility]    TKA Brief Operative Note    Arina Munoz Patient Status:  Outpatient in a Bed    9/10/1950 MRN [id number]   Location [facility] OPERATING ROOM Attending Maycol Hamilton MD     PCP BONG REGAN MD, MD       Preop DX: right knee degenerative joint disease   Postop DX: right knee degenerative joint disease  Procedure:  right total knee arthroplasty  Surgeon: Maycol Hamilton MD  Assistants:  ISIDRO Quinones; SA Jeanette  Anesthesia: Spinal Anesthesia  EBL: 50 mL  Tourniquet Time: 74 minutes  Fluids: 1000 mL  Specimen: Bone right knee  Findings: DJD  right knee  Drain: None  Preop ROM: 3 to 115 degrees  Implants: Nexgen CR  Complications: none  All needle and sponge counts correct prior to leaving the operating room.  All assistants were a medical necessity to complete this procedure.  Plan: Transfer to recovery room in stable condition.  Transition to floor when stable.   I was present and scrubbed for the entire procedure.    MAYCOL HAMILTON MD  [phone] (c)  [phone] (o)  4/3/2024

## 2024-04-03 NOTE — H&P
History & Physical Examination    Patient Name: Arina Munoz  MRN: [id number]  CSN: [id number]  YOB: 1950    Diagnosis: right knee arthritis    Present Illness: 74 y/o female with history of right knee arthritis.  She has exhausted non surgical treatment and presents to the hospital today for right TKA surgery.    Medications Prior to Admission   Medication Sig Dispense Refill Last Dose    Cholecalciferol (VITAMIN D HIGH POTENCY OR) Take by mouth daily.   3/26/2024    DULoxetine 60 MG Oral Cap DR Particles Take 1 capsule (60 mg total) by mouth daily. (Patient taking differently: Take 1 capsule (60 mg total) by mouth every morning.) 90 capsule 3 4/3/2024 at 0800    lisinopril 5 MG Oral Tab Take 1 tablet (5 mg total) by mouth daily.   4/2/2024 at 0800    Metoprolol Succinate ER 50 MG Oral Tablet 24 Hr Take 1 tablet (50 mg total) by mouth daily.   4/3/2024 at 0800    Calcium Carbonate-Vitamin D (CALCIUM 500 + D OR) Take 1 tablet by mouth daily.   3/26/2024    allopurinol 100 MG Oral Tab Take 2 tablets (200 mg total) by mouth daily.   4/2/2024 at 0800    furosemide 20 MG Oral Tab Take 1 tablet (20 mg total) by mouth daily.   4/2/2024 at 0800    Atorvastatin Calcium 10 MG Oral Tab Take 1 tablet (10 mg total) by mouth every morning.   4/2/2024 at 0800    Meloxicam (MOBIC) 15 MG Oral Tab Take 1 tablet (15 mg total) by mouth daily with food. (Patient not taking: Reported on 8/17/2023) 40 tablet 3 More than a month    Naproxen Sodium (ALEVE) 220 MG Oral Cap Take 220 mg by mouth 2 (two) times daily as needed. (Patient not taking: Reported on 8/17/2023)   More than a month       Current Facility-Administered Medications   Medication Dose Route Frequency    ceFAZolin (Ancef) 2 g in 20mL IV syringe premix  2 g Intravenous Once    lactated ringers infusion   Intravenous Continuous    metoprolol tartrate (Lopressor) tab 25 mg  25 mg Oral Once PRN       Allergies: Sulfa antibiotics    Past Medical History:    Diagnosis Date    Esophageal reflux     Essential hypertension     Gout     Hearing impairment     Bilateral hearing aides    Hyperlipidemia     Prediabetes     Visual impairment     Glasses and Contacts     Past Surgical History:   Procedure Laterality Date    CARPAL TUNNEL RELEASE Bilateral     COLONOSCOPY  2019    COLONOSCOPY N/A 2019    Procedure: COLONOSCOPY, POSSIBLE BIOPSY, POSSIBLE POLYPECTOMY 26752;  Surgeon: Tanmay Klein MD;  Location: [facility], [facility]    EXCIS LUMBAR DISK,ONE LEVEL      KNEE REPLACEMENT SURGERY Left     [facility] BIOPSY STEREO NODULE 1 SITE RIGHT (CPT=19081)      2022    [facility] LOCALIZATION WIRE 1 SITE LEFT (CPT=19281)      benign     Family History   Problem Relation Age of Onset    Cancer Father      Social History     Tobacco Use    Smoking status: Former     Types: Cigarettes     Quit date:      Years since quittin.2    Smokeless tobacco: Never   Substance Use Topics    Alcohol use: Yes     Comment: Occasionally         SYSTEM Check if Review is Normal Check if Physical Exam is Normal If not normal, please explain:   HEENT [ X] [ ]    NECK & BACK [ X] [ ]    HEART [ X] [ ]    LUNGS [ X] [ ]    ABDOMEN [ X] [ ]    UROGENITAL [ X] [ ]    EXTREMITIES [ ] [ ]x Right knee skin intact.  Right knee pain with ROM.  RLE NVID.  5/5 strength with DF/PF, SILT, palpable DP pulse present   OTHER        [ x ] I have discussed the risks and benefits and alternatives with the patient/family.  They understand and agree to proceed with plan of care.  [ x ] I have reviewed the History and Physical done within the last 30 days.  Any changes noted above.    LALI ALVAREZ NP  4/3/2024  9:20 AM  [phone]

## 2024-04-03 NOTE — ANESTHESIA PROCEDURE NOTES
Spinal Block    Date/Time: 4/3/2024 11:06 AM    Performed by: Tejas Acosta MD  Authorized by: Tejas Acosta MD      General Information and Staff    Start Time:  4/3/2024 11:06 AM  End Time:  4/3/2024 11:06 AM  Anesthesiologist:  Tejas Acosta MD  Performed by:  Anesthesiologist  Patient Location:  OR  Preanesthetic Checklist: patient identified, IV checked, risks and benefits discussed, monitors and equipment checked, pre-op evaluation, timeout performed, anesthesia consent and sterile technique used      Procedure Details    Patient Position:  Sitting  Prep: ChloraPrep    Monitoring:  Cardiac monitor  Approach:  Midline  Location:  L3-4  Injection Technique:  Single-shot    Needle    Needle Type:  Pencil-tip  Needle Gauge:  24 G  Needle Length:  3.5 in    Assessment    Sensory Level:   Events: clear CSF, CSF aspirated, well tolerated and blood negative      Additional Comments

## 2024-04-03 NOTE — ANESTHESIA POSTPROCEDURE EVALUATION
Patient: Arina Munoz    Procedure Summary       Date: 04/03/24 Room / Location: [facility] MAIN OR 05 / [facility] MAIN OR    Anesthesia Start: 1047 Anesthesia Stop:     Procedure: Right total knee arthroplasty (Right: Knee) Diagnosis: (Right knee degenerative joint disease)    Surgeons: Villa Hamilton MD Anesthesiologist: Essence Torres MD    Anesthesia Type: spinal ASA Status: 2            Anesthesia Type: spinal    Vitals Value Taken Time   /55 04/03/24 1350   Temp 97.2 °F (36.2 °C) 04/03/24 1243   Pulse 52 04/03/24 1350   Resp 15 04/03/24 1349   SpO2 94 % 04/03/24 1348   Vitals shown include unfiled device data.    EM AN Post Evaluation:   Patient Evaluated in PACU  Patient Participation: complete - patient participated  Level of Consciousness: awake and alert  Pain Score: 0  Pain Management: adequate  Airway Patency:patent  Dental exam unchanged from preop  Yes    Nausea/Vomiting: none  Cardiovascular Status: acceptable and hemodynamically stable  Respiratory Status: room air and acceptable  Postoperative Hydration acceptable  Comments: Report to Citlaly Villanueva CRNA  4/3/2024 2:29 PM

## 2024-04-03 NOTE — PHYSICAL THERAPY NOTE
PHYSICAL THERAPY KNEE EVALUATION - INPATIENT      Room Number: Room 3/Room 3-A  Evaluation Date: 4/3/2024  Type of Evaluation: Initial  Physician Order: PT Eval and Treat    Presenting Problem: s/p R TKA on 4/3     Reason for Therapy: Mobility Dysfunction and Discharge Planning    PHYSICAL THERAPY ASSESSMENT   Patient is a 73 year old female admitted 4/3/2024 for R TKA.  Prior to admission, patient's baseline is independent with ADLs and functional mobility without assistive device.  Patient is currently functioning below baseline with bed mobility, transfers, gait, stair negotiation, standing prolonged periods, and performing household tasks.  Patient is requiring stand-by assist and contact guard assist as a result of the following impairments: decreased functional strength, impaired standing balance, and limited R knee ROM.  Physical Therapy will continue to follow for duration of hospitalization.    Patient will benefit from continued skilled PT Services at discharge to promote functional independence and safety with additional support and return home with home health PT.    PLAN  PT Treatment Plan: Bed mobility;Body mechanics;Endurance;Energy conservation;Patient education;Family education;Gait training;Strengthening;Stair training;Transfer training;Balance training  Rehab Potential : Good  Frequency (Obs): BID    PHYSICAL THERAPY MEDICAL/SOCIAL HISTORY     Problem List  Principal Problem:    Primary osteoarthritis of right knee  Active Problems:    Gout    Essential hypertension    Hyperlipidemia    HOME SITUATION  Home Situation  Type of Home: House  Home Layout: One level  Stairs to Enter : 4  Railing: Yes  Lives With: Spouse  Drives: Yes  Patient Owned Equipment: Cane  Patient Regularly Uses: None     Prior Level of Evangeline: independent     SUBJECTIVE  Agreeable to activity.     PHYSICAL THERAPY EXAMINATION   OBJECTIVE  Precautions: None  Fall Risk: Standard fall risk    WEIGHT BEARING  RESTRICTION  Weight Bearing Restriction: R lower extremity  R Lower Extremity: Weight Bearing as Tolerated    PAIN ASSESSMENT  Ratin    COGNITION  Overall Cognitive Status:  WFL - within functional limits    RANGE OF MOTION AND STRENGTH ASSESSMENT  Upper extremity ROM and strength are within functional limits.  Lower extremity ROM is within functional limits.  Lower extremity strength is within functional limits.    BALANCE  Static Sitting: Good  Dynamic Sitting: Fair +  Static Standing: Fair  Dynamic Standing: Fair -    AM-PAC '6-Clicks' INPATIENT SHORT FORM - BASIC MOBILITY  How much difficulty does the patient currently have...  Patient Difficulty: Turning over in bed (including adjusting bedclothes, sheets and blankets)?: A Little   Patient Difficulty: Sitting down on and standing up from a chair with arms (e.g., wheelchair, bedside commode, etc.): A Little   Patient Difficulty: Moving from lying on back to sitting on the side of the bed?: A Little   How much help from another person does the patient currently need...   Help from Another: Moving to and from a bed to a chair (including a wheelchair)?: A Little   Help from Another: Need to walk in hospital room?: A Little   Help from Another: Climbing 3-5 steps with a railing?: A Little     AM-PAC Score:  Raw Score: 18   Approx Degree of Impairment: 46.58%   Standardized Score (AM-PAC Scale): 43.63   CMS Modifier (G-Code): CK     FUNCTIONAL ABILITY STATUS  Functional Mobility/Gait Assessment  Gait Assistance: Contact guard assist  Distance (ft): 50  Assistive Device: Rolling walker  Pattern: Shuffle;R Decreased stance time (slow pace, flexed posture)  Supine to Sit: stand-by assist  Sit to Stand: contact guard assist    Exercise/Education Provided:  Bed mobility  Body mechanics  Energy conservation  Functional activity tolerated  Gait training  Posture  Lower therapeutic exercise:  Ankle pumps  Heel slides  LAQ  Quad sets  Sitting knee flexion  Transfer  training    RN approved PT eval. Pt received resting in bed with  at bedside. Introduced self and role. Educated on WBAT, TKA mobility protocol and exercises, PT plan of care, goals for today. Pt verbalized understanding. No c/o pain this date. Demos bed mobility, STS transfer to/from EOB with RW and VC for safety, and short distance hallway ambulation with RW. No LOB with activity, no unsteady gait, but slow and guarded. VC for upright posture and deep breathing. Returned to room and was left sitting in chair with needs within reach, handoff to RN complete.     The patient's Approx Degree of Impairment: 46.58% has been calculated based on documentation in the [facility] '6 clicks' Inpatient Basic Mobility Short Form.  Research supports that patients with this level of impairment may benefit from home with  PT.  Final disposition will be made by interdisciplinary medical team.    Patient End of Session: Up in chair;Needs met;Call light within reach;RN aware of session/findings;All patient questions and concerns addressed;Family present    CURRENT GOALS  Goals to be met by: 4/10/24  Patient Goal Patient's self-stated goal is: go home   Goal #1 Patient is able to demonstrate supine - sit EOB @ level: modified independent     Goal #1   Current Status    Goal #2 Patient is able to demonstrate transfers Sit to/from Stand at assistance level: modified independent     Goal #2  Current Status    Goal #3 Patient is able to ambulate 150 feet with assistive device at assistance level: supervision   Goal #3   Current Status    Goal #4 Patient will negotiate 4 stairs/one curb w/ assistive device and supervision   Goal #4   Current Status    Goal #5  AROM 0 degrees extension to 95 degrees flexion     Goal #5   Current Status    Goal #6 Patient independently performs home exercise program for ROM/strengthening per the instructions provided in preparation for discharge.   Goal #6  Current Status      Patient Evaluation  Complexity Level:  History Low - no personal factors and/or co-morbidities   Examination of body systems Low -  addressing 1-2 elements   Clinical Presentation Low- Stable   Clinical Decision Making  Low Complexity     Therapeutic Activity:  25 minutes

## 2024-04-03 NOTE — CONSULTS
[facility]    PATIENT'S NAME: EBEN SAGASTUME   ATTENDING PHYSICIAN: Villa Hamilton MD   CONSULTING PHYSICIAN: Maryanne Palumbo MD   PATIENT ACCOUNT#:   [id number]    LOCATION:  Critical access hospital PACU 5 [facility] 10  MEDICAL RECORD #:   [id number]       YOB: 1950  ADMISSION DATE:       04/03/2024      CONSULT DATE:  04/03/2024    REPORT OF CONSULTATION    REASON FOR ADMISSION:  Right total knee arthroplasty.    HISTORY OF PRESENT ILLNESS:  The patient is a 73-year-old  female who had chronic right knee pain, underlying end-stage severe primary osteoarthritis, failed outpatient conservative medical management options, scheduled today for above-mentioned procedure by her orthopedic surgeon, Dr. Villa Hamilton.  Preoperatively, she had spinal block.  Postoperatively transferred to PACU for further monitoring.    PAST MEDICAL HISTORY:  Generalized osteoarthritis, hypertension, gastroesophageal reflux disease, gout, hyperlipidemia, and history of prediabetic state.    PAST SURGICAL HISTORY:  Left total knee arthroplasty, bilateral breast biopsy, and bilateral carpal tunnel release.    ALLERGIES:  Sulfa.    SOCIAL HISTORY:  Ex-tobacco user.  Social alcohol.  No drug use.  Lives with her family.  Independent for basic activities of daily living.    FAMILY HISTORY:  Father had cancer.    REVIEW OF SYSTEMS:  Currently resting in bed.  No right knee pain.  No chest pain.  No shortness of breath.  Other 12-point review of systems negative.      PHYSICAL EXAMINATION:    GENERAL:  Alert, oriented to time, place, and person, no acute distress.  VITAL SIGNS:  Temperature 97.2, pulse 53, respiratory rate 10, blood pressure 193/52, pulse ox 98% on room air.  HEENT:  Atraumatic.  Oropharynx clear.  Moist mucous membranes.  Ears, nose normal.  Eyes:  Anicteric sclerae.    NECK:  Supple.  No lymphadenopathy.  Trachea midline.  Full range of motion.  LUNGS:  Clear to auscultation bilaterally.  Normal respiratory  effort.   HEART:  Regular rate and rhythm.  S1 and S2 auscultated.  No murmur.  ABDOMEN:  Soft, nondistended.  No tenderness.  Positive bowel sounds.  EXTREMITIES:  Right knee dressing.  No leg edema.  No clubbing or cyanosis.  NEUROLOGIC:  Decreased sensation and muscle movement both lower extremities consistent with spinal block.  Otherwise, no focal findings.    ASSESSMENT AND PLAN:    1.   Right knee primary osteoarthritis status post right total knee arthroplasty, spinal block.  Pain control.  Neurovascular checks.  Aspirin for DVT prophylaxis.  Physical and occupational therapy.  2.   Gout.  Continue home medications.  3.   Essential hypertension.  Continue home medications and monitor.  4.   Hyperlipidemia.  Continue home medications.    Dictated By aMryanne Palumbo MD  d: 04/03/2024 13:07:35  t: 04/03/2024 13:15:31  Job [id number]  FB/

## 2024-04-04 VITALS
WEIGHT: 185 LBS | SYSTOLIC BLOOD PRESSURE: 130 MMHG | RESPIRATION RATE: 16 BRPM | OXYGEN SATURATION: 96 % | HEIGHT: 63 IN | BODY MASS INDEX: 32.78 KG/M2 | TEMPERATURE: 99 F | HEART RATE: 74 BPM | DIASTOLIC BLOOD PRESSURE: 54 MMHG

## 2024-04-04 LAB
ANION GAP SERPL CALC-SCNC: 3 MMOL/L (ref 0–18)
BUN BLD-MCNC: 16 MG/DL (ref 9–23)
BUN/CREAT SERPL: 21.9 (ref 10–20)
CALCIUM BLD-MCNC: 8.7 MG/DL (ref 8.7–10.4)
CHLORIDE SERPL-SCNC: 104 MMOL/L (ref 98–112)
CO2 SERPL-SCNC: 30 MMOL/L (ref 21–32)
CREAT BLD-MCNC: 0.73 MG/DL
DEPRECATED RDW RBC AUTO: 43.4 FL (ref 35.1–46.3)
EGFRCR SERPLBLD CKD-EPI 2021: 86 ML/MIN/1.73M2 (ref 60–?)
ERYTHROCYTE [DISTWIDTH] IN BLOOD BY AUTOMATED COUNT: 12.5 % (ref 11–15)
GLUCOSE BLD-MCNC: 126 MG/DL (ref 70–99)
HCT VFR BLD AUTO: 32.5 %
HGB BLD-MCNC: 11 G/DL
MCH RBC QN AUTO: 32.4 PG (ref 26–34)
MCHC RBC AUTO-ENTMCNC: 33.8 G/DL (ref 31–37)
MCV RBC AUTO: 95.9 FL
OSMOLALITY SERPL CALC.SUM OF ELEC: 287 MOSM/KG (ref 275–295)
PLATELET # BLD AUTO: 101 10(3)UL (ref 150–450)
POTASSIUM SERPL-SCNC: 4.7 MMOL/L (ref 3.5–5.1)
RBC # BLD AUTO: 3.39 X10(6)UL
SODIUM SERPL-SCNC: 137 MMOL/L (ref 136–145)
WBC # BLD AUTO: 7 X10(3) UL (ref 4–11)

## 2024-04-04 PROCEDURE — 99214 OFFICE O/P EST MOD 30 MIN: CPT | Performed by: INTERNAL MEDICINE

## 2024-04-04 NOTE — OCCUPATIONAL THERAPY NOTE
OCCUPATIONAL THERAPY EVALUATION - INPATIENT     Room Number: Room 3/Room 3-A  Evaluation Date: 4/4/2024  Type of Evaluation: Initial       Physician Order: IP Consult to Occupational Therapy  Reason for Therapy: ADL/IADL Dysfunction and Discharge Planning    OCCUPATIONAL THERAPY ASSESSMENT     Patient is a 73 year old female admitted 4/3/2024 for R TKA; WBAT.  Prior to admission, pt was independent.  Patient is currently requiring up to max A for ADLs overall along with independence for functional transfers with use of RW.  can assist.    Education provided  Educated pt about role of OT and hospital therapy process as well as adaptive dressing techniques and use of ice. Pt verbalized/demonstrated proper carryover.    DC OT    OCCUPATIONAL THERAPY MEDICAL/SOCIAL HISTORY     Problem List  Principal Problem:    Primary osteoarthritis of right knee  Active Problems:    Gout    Essential hypertension    Hyperlipidemia      Past Medical History  Past Medical History:   Diagnosis Date    Esophageal reflux     Essential hypertension     Gout     Hearing impairment     Bilateral hearing aides    Hyperlipidemia     Prediabetes     Visual impairment     Glasses and Contacts       Past Surgical History  Past Surgical History:   Procedure Laterality Date    CARPAL TUNNEL RELEASE Bilateral     COLONOSCOPY  09/2019    COLONOSCOPY N/A 09/19/2019    Procedure: COLONOSCOPY, POSSIBLE BIOPSY, POSSIBLE POLYPECTOMY 37469;  Surgeon: Tanmay Klein MD;  [facility]: [facility]    EXCIS LUMBAR DISK,ONE LEVEL      KNEE REPLACEMENT SURGERY Left 2011    [facility] BIOPSY STEREO NODULE 1 SITE RIGHT (CPT=19081)      9-1-2022    [facility] LOCALIZATION WIRE 1 SITE LEFT (CPT=19281)      benign       HOME SITUATION  Type of Home: House  Home Layout: One level  Lives With: Spouse     Toilet and Equipment: Comfort height toilet;Toilet riser                Drives: Yes  Patient Regularly Uses: None    SUBJECTIVE  \"I peed.\"    OCCUPATIONAL  THERAPY EXAMINATION      OBJECTIVE  Precautions: None  Fall Risk: Standard fall risk    PAIN ASSESSMENT  Rating: Unable to rate  Location: R knee  Management Techniques: Ice       RANGE OF MOTION   Upper extremity ROM is within functional limits     STRENGTH ASSESSMENT  Upper extremity strength is within functional limits     COORDINATION  Gross Motor: WFL   Fine Motor: WFL     ACTIVITIES OF DAILY LIVING ASSESSMENT  AM-PAC ‘6-Clicks’ Inpatient Daily Activity Short Form  How much help from another person does the patient currently need…  -   Putting on and taking off regular lower body clothing?: A Lot  -   Bathing (including washing, rinsing, drying)?: A Little  -   Toileting, which includes using toilet, bedpan or urinal? : None  -   Putting on and taking off regular upper body clothing?: None  -   Taking care of personal grooming such as brushing teeth?: None  -   Eating meals?: None    AM-PAC Score:  Score: 21  Approx Degree of Impairment: 32.79%  Standardized Score (AM-PAC Scale): 44.27  CMS Modifier (G-Code): CJ            FUNCTIONAL TRANSFER ASSESSMENT        Sit to stand: ind  Toilet Transfer: ind  Chair Transfer: ind    FUNCTIONAL ADL ASSESSMENT  Overall max A (LB Dressing)  All other ADLs ind-min A    The patient's Approx Degree of Impairment: 32.79% has been calculated based on documentation in the [facility] '6 clicks' Inpatient Daily Activity Short Form.  Research supports that patients with this level of impairment may benefit from home health. Final disposition will be made by interdisciplinary medical team.       Patient Evaluation Complexity Level:   Occupational Profile/Medical History LOW - Brief history including review of medical or therapy records    Specific performance deficits impacting engagement in ADL/IADL LOW  1 - 3 performance deficits    Client Assessment/Performance Deficits LOW - No comorbidities nor modifications of tasks    Clinical Decision Making LOW - Analysis of occupational profile,  problem-focused assessments, limited treatment options    Overall Complexity LOW     OT Session Time: 20 minutes  Self-Care Home Management: 10 minutes           Jessi Francisco OT  [facility]  Inpatient Rehabilitation  Occupational Therapy  [phone]

## 2024-04-04 NOTE — PROGRESS NOTES
[facility]  part of [facility]    Progress Note    Arina Munoz Patient Status:  Outpatient in a Bed    9/10/1950 MRN [id number]   Location [facility] Attending Villa Hamilton MD   Hosp Day # 0   PCP BONG REGAN MD, MD       Subjective:   Arina Munoz is a(n) 73 year old female who is POD #1 status post a right total knee arthroplasty. Doing okay this morning, having some pain and stiffness    Objective:   Blood pressure 114/47, pulse 76, temperature 98.8 °F (37.1 °C), temperature source Oral, resp. rate 16, height 5' 3\" (1.6 m), weight 185 lb (83.9 kg), SpO2 95%.      right Lower Extremity    Incision:  Intact       Clean     Lower Extremity Motor:   Quadriceps: 5/5   Extensor hallucis: 5/5   Dorsiflexion :5/5   Plantarflexion: 5/5    Lower Extremity Sensory:   Tibial Nerve: intact to light touch   Superficial Peroneal Nerve: intact to light touch   Deep Peroneal Nerve: intact to light touch    Pulses:   Posterior Tibial: Present   Dorsalis Pedis: Present   < 2 sec cap refill to toes noted    Calf Tenderness   No CT BLE    Abnormal Exam Findings: None    Assessment and Plan:     Primary osteoarthritis of right knee      Gout      Essential hypertension      Hyperlipidemia      Post operative day 1 status post right total knee arthroplasty  1. WBAT--PT/OT  2. Continue physical therapy  3. Deep venous thrombosis prophylaxis: Chemoprophylaxis/JOSEFA hose/SCDs  4. Pain management: Oral meds  5. D/C plan: home with home health  6. DC home if clears PT  7. May shower, dry dressing changes prn    Results:     Lab Results   Component Value Date    WBC 7.0 2024    HGB 11.0 (L) 2024    HCT 32.5 (L) 2024    .0 (L) 2024    CREATSERUM 0.73 2024    BUN 16 2024     2024    K 4.7 2024     2024    CO2 30.0 2024     (H) 2024    CA 8.7 2024    ALB 4.4 2024    ALKPHO 56 2024    BILT  0.4 03/11/2024    TP 6.8 03/11/2024    AST 29 03/11/2024    ALT 38 03/11/2024       XR KNEE (1 OR 2 VIEWS), RIGHT (CPT=73560)    Result Date: 4/3/2024  CONCLUSION:  1. Total right knee arthroplasty.    Dictated by (CST): Ariel Taylor MD on 4/03/2024 at 2:30 PM     Finalized by (CST): Ariel Taylor MD on 4/03/2024 at 2:31 PM                 MAYCOL WEI MD  [facility]  [phone] (c)  [phone] (o)  4/4/2024

## 2024-04-04 NOTE — PHYSICAL THERAPY NOTE
PHYSICAL THERAPY KNEE TREATMENT NOTE - INPATIENT     Room Number: Room 3/Room 3-A             Presenting Problem: s/p R TKA on 4/3       Problem List  Principal Problem:    Primary osteoarthritis of right knee  Active Problems:    Gout    Essential hypertension    Hyperlipidemia      PHYSICAL THERAPY ASSESSMENT   Patient demonstrates good  progress this session, goals  remain in progress.    Patient continues to function below baseline with bed mobility, transfers, gait, stair negotiation, and standing prolonged periods.  Contributing factors to remaining limitations include decreased functional strength, decreased endurance/aerobic capacity, pain, and decreased muscular endurance.  Next session anticipate patient to progress bed mobility, transfers, gait, stair negotiation, and standing prolonged periods.  Physical Therapy will continue to follow patient for duration of hospitalization.    Patient continues to benefit from continued skilled PT services: at discharge to promote functional independence and safety with additional support and return home with home health PT.    PLAN  PT Treatment Plan: Bed mobility;Body mechanics;Endurance;Energy conservation;Patient education;Family education;Gait training;Strengthening;Stair training;Transfer training;Balance training  Frequency (Obs): BID    SUBJECTIVE  Pt was agreeable to therapy session.       OBJECTIVE  Precautions: None    WEIGHT BEARING STATUS        R Lower Extremity: Weight Bearing as Tolerated       PAIN ASSESSMENT   Ratin  Location: R knee  Management Techniques: Activity promotion;Body mechanics;Relaxation;Repositioning    BALANCE  Static Sitting: Good  Dynamic Sitting: Fair +  Static Standing: Fair  Dynamic Standing: Fair -    ACTIVITY TOLERANCE                         O2 WALK       AM-PAC '6-Clicks' INPATIENT SHORT FORM - BASIC MOBILITY  How much difficulty does the patient currently have...  Patient Difficulty: Turning over in bed (including  adjusting bedclothes, sheets and blankets)?: A Little   Patient Difficulty: Sitting down on and standing up from a chair with arms (e.g., wheelchair, bedside commode, etc.): A Little   Patient Difficulty: Moving from lying on back to sitting on the side of the bed?: A Little   How much help from another person does the patient currently need...   Help from Another: Moving to and from a bed to a chair (including a wheelchair)?: A Little   Help from Another: Need to walk in hospital room?: A Little   Help from Another: Climbing 3-5 steps with a railing?: A Little     AM-PAC Score:  Raw Score: 18   Approx Degree of Impairment: 46.58%   Standardized Score (AM-PAC Scale): 43.63   CMS Modifier (G-Code): CK    FUNCTIONAL ABILITY STATUS  Functional Mobility/Gait Assessment  Gait Assistance: Supervision  Distance (ft): 150'  Assistive Device: Rolling walker  Pattern: R Decreased stance time  Stairs: Stairs  How Many Stairs: 4  Device: 1 Rail  Assist: Contact guard assist  Pattern: Ascend and Descend  Ascend and Descend : Step to  Rolling:  NT  Supine to Sit:  NT  Sit to Supine:  NT  Sit to Stand: stand-by assist    Additional Information: Pt is received in the chair and was cleared for therapy session. Pt is SBA with sit<>stand transfers with the RW. Pt was able to AMB about 150' with the RW SBA. Pt with decreased jeffrey and step length but with good balance and safety awareness. Pt AMB to the stairs for stair training. Pt was educated and able to negotiate 4 stairs with 1 HR CGA for safety. Pt was cued for proper technique and sequencing. Pt with good balance on the stairs. Returned pt back to the room and to sitting in the chair with all needs within reach. Pt is on track to dc to home once medically cleared. Reported to the RN on the status of the pt.     The patient's Approx Degree of Impairment: 46.58% has been calculated based on documentation in the [facility] '6 clicks' Inpatient Basic Mobility Short Form.  Research  supports that patients with this level of impairment may benefit from home with [facility] and assist once medically cleared. .  Final disposition will be made by interdisciplinary medical team.          Knee ROM                 Patient End of Session: Up in chair;Needs met;Call light within reach;RN aware of session/findings;All patient questions and concerns addressed;Family present    CURRENT GOALS  Goals to be met by: 4/10/24  Patient Goal Patient's self-stated goal is: go home   Goal #1 Patient is able to demonstrate supine - sit EOB @ level: modified independent     Goal #1   Current Status NT received in the chair   Goal #2 Patient is able to demonstrate transfers Sit to/from Stand at assistance level: modified independent     Goal #2  Current Status SBA with the RW    Goal #3 Patient is able to ambulate 150 feet with assistive device at assistance level: supervision   Goal #3   Current Status 150' with the RW SBA   Goal #4 Patient will negotiate 4 stairs/one curb w/ assistive device and supervision   Goal #4   Current Status 4 stairs with 1 HR CGA   Goal #5  AROM 0 degrees extension to 95 degrees flexion     Goal #5   Current Status IN PROGRESS   Goal #6 Patient independently performs home exercise program for ROM/strengthening per the instructions provided in preparation for discharge.   Goal #6  Current Status IN PROGRESS       Therapeutic Activity: 30 minutes

## 2024-04-04 NOTE — DISCHARGE SUMMARY
[facility]  part of [facility]    DISCHARGE SUMMARY     Arina Munoz Patient Status:  Outpatient in a Bed    9/10/1950 MRN [id number]   Location [facility] Attending Smiley Rodriguez MD   Hosp Day # 0 PCP BONG REGAN MD, MD     Date of Admission: 4/3/2024  Date of Discharge:  2024    Discharge Disposition: Home or Self Care    Discharge Diagnosis:     OA s/p R TKA  Gout  HTN  HLD    History of Present Illness:     The patient is a 73-year-old  female who had chronic right knee pain, underlying end-stage severe primary osteoarthritis, failed outpatient conservative medical management options, scheduled today for above-mentioned procedure by her orthopedic surgeon, Dr. Villa Hamilton. Preoperatively, she had spinal block. Postoperatively transferred to PACU for further monitoring.     Brief Synopsis:     Patient underwent R TKA and tolerated the surgery well. The patient will follow up with PCP and Ortho as outpatient.   Discharge medications including pain meds, abx, dvt ppx ordered by ortho.     Patient is to remain compliant with all discharge medications and instructions and to follow up as advised.   Patient encouraged to make healthy lifestyle and dietary changes.    Lace+ Score: 33  59-90 High Risk  29-58 Medium Risk  0-28   Low Risk       TCM Follow-Up Recommendation:  LACE 29-58: Moderate Risk of readmission after discharge from the hospital.      Procedures during hospitalization:   R TKA    Incidental or significant findings and recommendations (brief descriptions):  None    Lab/Test results pending at Discharge:   None    Consultants:  Ortho    Discharge Medication List:     Discharge Medications        CHANGE how you take these medications        Instructions Prescription details   DULoxetine 60 MG Cpep  Commonly known as: Cymbalta  What changed: when to take this      Take 1 capsule (60 mg total) by mouth daily.   Quantity: 90 capsule  Refills: 3             CONTINUE taking these medications        Instructions Prescription details   allopurinol 100 MG Tabs  Commonly known as: Zyloprim      Take 2 tablets (200 mg total) by mouth daily.   Refills: 0     atorvastatin 10 MG Tabs  Commonly known as: Lipitor      Take 1 tablet (10 mg total) by mouth every morning.   Refills: 0     CALCIUM 500 + D OR      Take 1 tablet by mouth daily.   Refills: 0     furosemide 20 MG Tabs  Commonly known as: Lasix      Take 1 tablet (20 mg total) by mouth daily.   Refills: 0     lisinopril 5 MG Tabs  Commonly known as: Prinivil; Zestril      Take 1 tablet (5 mg total) by mouth daily.   Refills: 0     metoprolol succinate ER 50 MG Tb24  Commonly known as: Toprol XL      Take 1 tablet (50 mg total) by mouth daily.   Refills: 0     VITAMIN D HIGH POTENCY OR      Take by mouth daily.   Refills: 0            STOP taking these medications      Aleve 220 MG Caps  Generic drug: Naproxen Sodium        Meloxicam 15 MG Tabs  Commonly known as: Mobic                 [facility] reviewed: yes    Follow-up appointment:   Paty Gamble MD  [address] [address]  [phone]    Follow up in 1 week(s)      Villa Hamilton MD  [address]  [facility] [address]  [phone]    Follow up in 1 week(s)      Appointments for Next 30 Days 4/4/2024 - 5/4/2024        Date Arrival Time Visit Type Length Department Provider     4/4/2024  8:30 AM  [facility] PT IP FOLLOW UP [4465] 30 min [facility] Physical Therapy Chidi Ricketts PTA    Patient Instructions:         Location Instructions:     Masks are optional for all patients and visitors, unless otherwise indicated.               4/4/2024  2:00 PM  EE PT IP FOLLOW UP [4465] 30 min [facility] Physical Therapy Chidi Ricketts PTA    Patient Instructions:         Location Instructions:     Masks are optional for all patients and visitors, unless otherwise indicated.               5/2/2024  2:20 PM  [facility] SCR BLU  [2386] 20 min [facility] Mammography [facility] DAVIS RM2    Patient Instructions:     Please arrive 15 minutes prior to your appointment.    If breastfeeding, pump or breastfeed one hour prior to your appointment time.    Continuous glucose monitors must be removed so the appointment should be scheduled near the normal replacement date.    It is important to bring your previous mammography films to your appointment so that the radiologist has previous images to compare this exam to. Having your previous mammograms on file helps the radiologist notice subtle changes in your breast tissue that can alert us to a need for further studies. Without these images, the radiologist will not be able to detect the subtle changes and your final reading will be delayed until we receive them.    It is important that the annual screening mammogram be scheduled at least a year and a day after your last screening mammogram to ensure your insurance plan will cover the cost, unless you are experiencing breast related symptoms.    Do NOT use perfumes, deodorant, talcum powder, lotions, or creams on your breasts or underarms. They leave a coating that may be picked up by the x-rays, thereby distorting the mammogram.    Wear a two piece outfit the day of the exam. This allows you to be more comfortable during the exam.      Location Instructions:     Your appointment will be at the [facility] located at [address] in [place], [place].&nbsp; For self-parking please park in the Green Lot. There is also  parking at the Main Entrance. Enter the door that says East Entrance. Inside the building walk to the right and check in with [facility]. The phone number for this location is [phone].  If any imaging exam related to this procedure has been done at a facility other than an [facility], please bring a copy of the previous films or CDs with you. If we do not have  copies of your prior images (not performed at [facility] or [facility]) at the time of your exams, your results may be delayed and possibly result in rescheduling your appointment. You can also have your previous images sent to [facility] or [facility] prior to your appointment. Films may be mailed to:  [facility] Attn: Radiology Imaging Library [address] [place] [address]  [facility] Attn: Radiology File Room [address] [place] [address]   You will be asked to fill out a history form prior to the exam.  Please bring your insurance card and photo ID. You will also need to bring your doctor's order unless your physician's office submitted the order electronically or faxed the order. Without the order, your test may be delayed or postponed.  The statements below are provided to all patients receiving an exam in our imaging department so you can be made aware of our procedures, which are designed for your safety and the best possible imaging.  Children: Children under the age of 12 must have another adult caregiver with them. Please do not bring your child/children without a caregiver. Because of the highly sensitive equipment and privacy of all our patients, children will not be permitted in the exam rooms, unless otherwise noted and in accordance with departmental policy.  MyChart: Having an electronic [employer] account will allow you to view your medical records, information regarding appointments, as well as reports of your imaging and lab results.  PATIENT RESPONSIBILITY ESTIMATE  - (Estimate) We will provide you with your estimated remaining deductible and coinsurance balance for your services at the time of check in.  - (Payment) Please be aware that you may be asked for payment at the time of service.  Masks are optional for all patients and visitors, unless otherwise indicated.                      Vital signs:  Temp:  [97.2 °F (36.2 °C)-98.8 °F  (37.1 °C)] 98.8 °F (37.1 °C)  Pulse:  [52-98] 76  Resp:  [10-18] 16  BP: ()/(47-72) 114/47  SpO2:  [92 %-99 %] 95 %    Physical Exam:    Gen: NAD AO x3  Chest: good air entry CTABL  CVS: normal s1 and s2 RR  Abd: NABS soft NT ND  Neuro: CN 2-12 grossly intact  Ext: no edema in bilateral LE    -----------------------------------------------------------------------------------------------  PATIENT DISCHARGE INSTRUCTIONS: See electronic chart    Smiley Rodriguez MD  Hospitalist    Time spent:  > 30 minutes    The 21st Century Cures Act makes medical notes like these available to patients in the interest of transparency. Please be advised this is a medical document. Medical documents are intended to carry relevant information, facts as evident, and the clinical opinion of the practitioner. The medical note is intended as peer to peer communication and may appear blunt or direct. It is written in medical language and may contain abbreviations or verbiage that are unfamiliar.

## 2024-04-04 NOTE — OPERATIVE REPORT
[address] [address]    OPERATIVE REPORT    PATIENT NAME: Arina Munoz  MR#: [id number]    DATE OF PROCEDURE: 4/3/2024  PREOPERATIVE DIAGNOSIS: Degenerative Arthritis (e.g., OA) right knee  POSTOPERATIVE DIAGNOSIS: Degenerative Arthritis (e.g., OA) right knee  SECONDARY DIAGNOSIS: None  OPERATION PERFORMED: Right cemented total knee arthroplasty  SURGEON: Villa Hamilton  ASSISTANT(S): 1st: Michelle Quinones and 2nd: Jeanette LINDA  YOB: 1950  ANESTHESIA: Spinal  BLOOD LOSS: 50  FLUIDS: 1000 cc of lactated ringers  TOURNIQUET TIME: 74 minutes  DRAIN: None  SPECIMEN: Bone from the right knee  COMPLICATIONS: None  FINDINGS: Degenerative Arthritis (e.g., OA) right knee  IMPLANTS:  Femoral Component: Size D , Right , Medial 25.9mm, Lateral 24.1mm, A/P 57.6mm, M/L 64mm, Zimaloy (Co-Cr-Mo)  Tibial Component: Sugar - NexGen PreCoat Stemmed, Tibial Plate (Size 3, Lot#: [id number])  Tibial Insert: Sugar - NexGen CR/CRA Anterior Constrained Articular Surface (Yellow, Size 12, A/P 42mm, M/L 66mm, Femoral Size C-H, Tibial Size 3-4, UHMWPE, machined from molded bar stock., Lot#: [id number])  Patellar Component: Sugar - NexGen LPS (Size 8.5 x 32mm, All-Poly, Lot#: [id number])  Cement: Sugar with None antibiotics  Other Devices:  Biomet - Bone Cement R (1x40, Lot#: [id number])  Sugar - NexGen Plug (Taper Stem, Lot#: [id number])  Sugar - NexGen (Stem Extension Screw, Lot#: [id number])    DISPOSITION: Patient was taken to the recovery room in stable condition.  BMI: 32.77  ACL Integrity: Absent    INDICATIONS: The patient is a 73-year-old woman who presented to the office with progressively worsening right knee pain. Her pain was refractory to all forms on non-operative management including nonsteroidal anti-inflammatory agents, activity modification of the knee and corticosteroid injection. The patient`s pain progressed to the point that her activities of daily living is  limited and she had a very limited range of motion and ability to walk. Based upon her radiographs, it showed severe degenerative joint disease of the right knee. She was indicated for a total knee arthroplasty based upon these clinical and radiographic findings. We reviewed the risks, benefits and alternatives to the procedure and informed consent was obtained. The risks discussed included, but were not limited, to infection, nerve injury, arterial injury, bleeding, deep venous thrombosis (DVT), pulmonary embolus, wear, lysis, need for reoperation, incision numbness, stiffness, loss of limb and loss of life. The patient understood these and informed consent was obtained as was medical clearance and there were no contraindications for surgery today.      OPERATIVE TECHNIQUE: On Wednesday, April 3, 2024, the patient was identified in the holding area and taken to the operating room. Prior to going to the operating room 1300mg of oral tranexamic acid was administered in the holding area for intra-operative and post-operative blood management. After preoperative antibiotics (2 grams of Cefazolin were ordered to be completed within 24 hours of the surgery) were administered, Ms. Munoz was given Spinal anesthetic. She was laid supine on the operative room table and a kenny catheter inserted under sterile conditions. We than placed a well padded tourniquet on the right upper thigh and the right lower extremity was sterilely prepped and draped in standard surgical fashion. At this point in time, a timeout was called, and it was noted that the right side was the appropriate side for the surgery and we were allowed to proceed. We then used an Esmarch to exsanguiate the lower extremity and elevated the tourniquet to 250 mmHg.    A midline incision was made over the patient`s right knee, dissecting down through the dermal and epidermal layers into the subcutaneous tissue. Bovie cautery was used to maintain homeostasis as we  dissected sharply down to the level of the knee capsule. The knee capsule was identified and a Mid-vastus arthrotomy was performed at this time using a Bovie cautery. We carried this distally onto the proximal tibia, releasing the anterior horn of the medial meniscus. We then debrided the anterior horns of the medial and lateral menisci, the anterior cruciate ligament (ACL) and a portion of the patellar fat pad. We completed our medial release by dissecting from the midline around to the posteriomedial corner in a subperiosteal fashion along the tibia and removed bony osteophytes off the distal femur and proximal tibia at this time. Once this was done we then marked [place]'s line on the distal end of the femur and cannulated the femur with a drill. We suctioned the medullary canal and inserted our first intramedullary guide and pinned it in place in 5 degrees of valgus. A standard distal femoral resection cut was made using a sagittal saw and the bony blocks were excised. The patella was then elevated from the wound and the thickness of the patella was measured to be 23mm. We used a sagittal saw and a free hand cut to remove the determined amount of patella, leaving 15mm of bone behind. A patellar protector was placed over the cancellous surface of the patella, it was then retracted laterally.    The extramedullary tibial guide was then placed along the anterior aspect of the lower right extremity and pinned in place in the appropriate position. We measured a 2mm section off the proximal medial tibia. Resection was made using the sagittal saw, taking care to leave a bone island posteriorly for the posterior cruciate ligament (PCL). Once this was complete, we then irrigated the wound with pulsatile lavage and placed a laminar  medially and laterally to remove the lateral and medial menisci, respectively. Once debrided, a 10mm spacer block was placed within the knee and noted that good balance of the knee  in extension and good alignment of the cuts based upon the drop milagros were achieved. The femur was then sized to be a size and size the tibia to be a size 3 and we drilled holes at approximately 3 degrees of external rotation into the distal femur based on the posterior condylar axis. This lined up nicely with Silva's line and we then placed the four-in-one cutting block into these drill holes. This block was then centered over the distal end of the femur. We then made our anterior distal femoral resection and removed the bony block. It was noted that we had an excellent grand piano sign on the distal femur signifying good external rotation of our cutting guide. At this point, we made the posterior condylar, chamfer and trochlear cuts. Once this was complete, all bony blocks were removed and the trial components were placed; a size NexGen CR-Flex Femoral Component femoral component and a size 3 NexGen PreCoat Stemmed, Tibial Plate tibial component pinned centered over the medial third of the tibial tubercle. A 12mm trial polyethylene insert was inserted into the tibial tray. The knee was taken through a range of motion and it was noted that there was excellent range of motion and good stability and tracking. The patella was then sized to be a size 8.5, drilled the 3 peg holes and place the patella trial as well. The knee was again taken through a range of motion and it was noted to have good stability and patella tracking. The keel for the final tibial component was then prepared by using the drill and punch. The femoral lug holes were prepped at this time as well.    All trial components were removed and we opened up the final implants. The wound was irrigated with pulsatile lavage and the Sugar cement was mixed. The NexGen PreCoat Stemmed, Tibial Plate Size 3 tibial tray was cemented into place followed by the NexGen CR-Flex Femoral Component and the excess cement removed. The 12mm trial polyethylene insert was  placed within the knee and the knee was placed in full extension allowing the cement to cure under pressurization. The patellar component was cemented at this time and held in place with a patella clamp for the cement to cure. During cementation a dilute bedatine solution was used to soak the wound. Once the cement was fully hardened the patella clamp was removed and the knee was taken though a full range of motion. Excellent range of motion and good stability was noted, throughout the entire arc of motion with the 12mm trial insert in place. Therefore, the wound was irrigated with pulsatile lavage and we inserted the final Sugar - NexGen CR/CRA Anterior Constrained Articular Surface (Yellow, Size 12, A/P 42mm, M/L 66mm, Femoral Size C-H, Tibial Size 3-4, UHMWPE, machined from molded bar stock., Lot#: [id number]) Yellow, Size 12, A/P 42mm, M/L 66mm, Femoral Size C-H, Tibial Size 3-4, UHMWPE, machined from molded bar stock. insert. Once this was locked into place the knee was placed in 45 degrees of flexion and the mid-vastus arthrotomy was closed. The capsule was closed with Quill #2 in continuous fashion. Once the arthrotomy was closed we allowed the knee to flex under gravity and we noted there was 120 degrees of flexion and full extension. Subcutaneous closure was performed in interrupted fashion with Monocryl #2-0. The skin edges were approximated using Monocryl #3-0. The skin edges were sealed with a topical skin adhesive and a sterile dressing was placed over the wound. The tourniquet was released at this time for a total of 74 minutes. A hemovac drain was not required. At this time an Ace bandage wrapped from toe to thigh. All needle and sponge counts were correct prior to leaving the operating room. The patient was aroused in the operating room and taken to the recovery room in stable condition.    Postoperatively, she will be weight bearing as tolerated. She will work with physical therapy. She will be on  deep venous thrombosis prophylaxis for the next three weeks. Based on medical history and extent of the surgery, the patient will be admitted to the hospital as an inpatient with an anticipated length of stay of 2-3 nights prior to discharge.      Surgery was performed on 4/3/2024. The procedure performed was a Primary TKA. The surgical assistant was Michelle Quinones. They were an active participant in the case and participated as a surgical assistant in all critical and noncritical steps including:  - Retractor placement and holding  - Operating room setup and patient positioning  - Closure of the various layers of soft tissue and skin  - Insertion of pins and holding screws of guides  - Dressing placement  - Transfer of patient to the stretcher and transport to the recovery room  Michelle Quinones was a critical part of the case, and the surgery could not be performed without a qualified surgical assistant.    I was present for all critical portions of the surgery and a backup surgeon was available at all times in case of emergency.    DICTATED BY: Villa Hamilton  DATE: 2024-04-03  SIGNED: 2024-04-03  DISTRIBUTION LIST:  Villa Hamilton

## 2024-05-02 ENCOUNTER — HOSPITAL ENCOUNTER (OUTPATIENT)
Dept: MAMMOGRAPHY | Facility: HOSPITAL | Age: 74
Discharge: HOME | End: 2024-05-02
Attending: INTERNAL MEDICINE
Payer: MEDICARE

## 2024-05-02 DIAGNOSIS — Z12.31 ENCOUNTER FOR SCREENING MAMMOGRAM FOR MALIGNANT NEOPLASM OF BREAST: ICD-10-CM

## 2024-05-02 PROCEDURE — 77063 BREAST TOMOSYNTHESIS BI: CPT | Performed by: INTERNAL MEDICINE

## 2024-05-02 PROCEDURE — 77067 SCR MAMMO BI INCL CAD: CPT | Performed by: INTERNAL MEDICINE

## 2024-06-20 RX ORDER — DULOXETIN HYDROCHLORIDE 60 MG/1
60 CAPSULE, DELAYED RELEASE ORAL DAILY
Qty: 30 CAPSULE | Refills: 0 | Status: SHIPPED | OUTPATIENT
Start: 2024-06-20

## 2024-06-20 NOTE — TELEPHONE ENCOUNTER
Refill Request    Medication request: DULoxetine 60 MG Oral Cap DR Particles.  Take 1 capsule (60 mg total) by mouth daily.      LOV:6/14/2023 Neftali Cruz MD   Due back to clinic per last office note:  RTC as needed   NOV: Visit date not found      [place]: 3/24/2024 #90 (90 days)    Urine drug screen (if applicable): N/A  Pain contract: N/A    LOV plan (if weaning or changing medications): Asymptomatic on Cymbalta 60. No new medical problems. I refilled her for 1 year. Return in 1 year for follow-up.       Spoke with patient and scheduled her for the follow up visit with Dr Cruz on 07/19/2024. Duloxetine refill sent for 1 month until patient is seen in office.

## 2024-07-16 RX ORDER — DULOXETIN HYDROCHLORIDE 60 MG/1
60 CAPSULE, DELAYED RELEASE ORAL DAILY
Qty: 30 CAPSULE | Refills: 0 | Status: SHIPPED | OUTPATIENT
Start: 2024-07-16

## 2024-07-16 NOTE — TELEPHONE ENCOUNTER
Refill Request    Medication request: DULoxetine 60 MG Oral Cap DR Particles. TAKE 1 CAPSULE BY MOUTH EVERY DAY.     LOV: 06/14/2023 with Neftali Cruz M.D.  Due back to clinic per last office note: Per Dr. Cruz: \"RTC as needed.\"  NOV: 7/19/2024 Neftali Cruz MD      [facility]: [facility]    Urine drug screen (if applicable): n/a  Pain contract: n/a    LOV plan (if weaning or changing medications): Per Dr. Cruz: \"Asymptomatic on Cymbalta 60. No new medical problems. I refilled her for 1 year. Return in 1 year for follow-up.\"

## 2024-07-19 ENCOUNTER — OFFICE VISIT (OUTPATIENT)
Dept: PHYSICAL MEDICINE AND REHAB | Facility: CLINIC | Age: 74
End: 2024-07-19
Payer: MEDICARE

## 2024-07-19 VITALS — OXYGEN SATURATION: 96 % | BODY MASS INDEX: 32 KG/M2 | HEART RATE: 66 BPM | WEIGHT: 180 LBS

## 2024-07-19 DIAGNOSIS — Z87.19 HISTORY OF ESOPHAGEAL REFLUX: ICD-10-CM

## 2024-07-19 DIAGNOSIS — M47.812 CERVICAL SPONDYLOSIS: Primary | ICD-10-CM

## 2024-07-19 PROCEDURE — 99213 OFFICE O/P EST LOW 20 MIN: CPT | Performed by: PHYSICAL MEDICINE & REHABILITATION

## 2024-07-19 NOTE — PROGRESS NOTES
[facility]  Progress Note    CHIEF COMPLAINT:    Chief Complaint   Patient presents with    Follow - Up     23 LOV. Patient is here for a year follow up. She is still taking cymbalta daily. No pain. Denies t/n. She had a R knee replacement since she was last time she was here.        History of Present Illness:  Arina Munoz is a 73 year old female who presents today for follow up for symptoms of Chronic neck pain.  This has been very well-controlled with Cymbalta for years.  No pain.  Medical problem that has occurred since last time his knee replacement.  She is doing well from that.      PAST MEDICAL HISTORY:  Past Medical History:    Esophageal reflux    Essential hypertension    Gout    Hearing impairment    Bilateral hearing aides    Hyperlipidemia    Prediabetes    Visual impairment    Glasses and Contacts       SURGICAL HISTORY:  Past Surgical History:   Procedure Laterality Date    Carpal tunnel release Bilateral     Colonoscopy  2019    Colonoscopy N/A 2019    Procedure: COLONOSCOPY, POSSIBLE BIOPSY, POSSIBLE POLYPECTOMY 97373;  Surgeon: Tanmay Klein MD;  Location: [facility], [facility]    Excis lumbar disk,one level      Knee replacement surgery Left     [facility] biopsy stereo nodule 1 site right (cpt=19081)      2022    Law localization wire 1 site left (cpt=19281)      benign       SOCIAL HISTORY:   Social History     Occupational History    Not on file   Tobacco Use    Smoking status: Former     Current packs/day: 0.00     Types: Cigarettes     Quit date:      Years since quittin.6    Smokeless tobacco: Never   Vaping Use    Vaping status: Never Used   Substance and Sexual Activity    Alcohol use: Yes     Comment: Occasionally    Drug use: No    Sexual activity: Not on file       CURRENT MEDICATIONS:   Current Outpatient Medications   Medication Sig Dispense Refill    DULOXETINE 60 MG Oral Cap DR Particles TAKE 1  CAPSULE BY MOUTH EVERY DAY 30 capsule 0    Cholecalciferol (VITAMIN D HIGH POTENCY OR) Take by mouth daily.      lisinopril 5 MG Oral Tab Take 1 tablet (5 mg total) by mouth daily.      Metoprolol Succinate ER 50 MG Oral Tablet 24 Hr Take 1 tablet (50 mg total) by mouth daily.      Calcium Carbonate-Vitamin D (CALCIUM 500 + D OR) Take 1 tablet by mouth daily.      allopurinol 100 MG Oral Tab Take 2 tablets (200 mg total) by mouth daily.      furosemide 20 MG Oral Tab Take 1 tablet (20 mg total) by mouth daily.      Atorvastatin Calcium 10 MG Oral Tab Take 1 tablet (10 mg total) by mouth every morning.         ALLERGIES:   Allergies   Allergen Reactions    Sulfa Antibiotics HIVES           PHYSICAL EXAM:   Pulse 66   Wt 180 lb (81.6 kg)   SpO2 96%   BMI 31.89 kg/m²     Body mass index is 31.89 kg/m².      General: No immediate distress  Extremities: No lower extremity edema bilaterally   Spine: full and painfree cervical ROM in all directions  Neuro:   Cognition: alert & oriented x 3, attentive, able to follow 2 step commands, comprehention intact, spontaneous speech intact      ASSESSMENT AND PLAN:  1. Cervical spondylosis  Continues to do well on Cymbalta.  I refilled her for 1 year.  Return in 1 year for follow-up.    2. History of esophageal reflux  Avoiding NSAIDs        RTC as needed      The patient was in agreement with the assessment and plan.  All questions were answered.        Neftali Cruz MD  Physical Medicine and Rehabilitation/Sports Medicine  [facility]

## 2024-08-12 RX ORDER — DULOXETIN HYDROCHLORIDE 60 MG/1
60 CAPSULE, DELAYED RELEASE ORAL DAILY
Qty: 30 CAPSULE | Refills: 0 | Status: SHIPPED | OUTPATIENT
Start: 2024-08-12

## 2024-08-12 NOTE — TELEPHONE ENCOUNTER
Refill Request    Medication request: DULOXETINE 60 MG Oral Cap DR Particles.  TAKE 1 CAPSULE BY MOUTH EVERY DAY      LOV:7/19/2024 Neftali Cruz MD   Due back to clinic per last office note:  Return in 1 year for follow-up.   NOV: Visit date not found      [place]: 7/16/24 #30 (30 days)    Urine drug screen (if applicable): N/A  Pain contract: N/A    LOV plan (if weaning or changing medications): Continues to do well on Cymbalta. I refilled her for 1 year.     Medication prescribed had 0 refills instead of the yearly refill. Medication refill approved.       '

## 2024-09-09 RX ORDER — DULOXETIN HYDROCHLORIDE 60 MG/1
60 CAPSULE, DELAYED RELEASE ORAL DAILY
Qty: 30 CAPSULE | Refills: 0 | Status: SHIPPED | OUTPATIENT
Start: 2024-09-09

## 2024-09-09 NOTE — TELEPHONE ENCOUNTER
Refill Request    Medication request: DULOXETINE 60 MG Oral Cap DR Particles. TAKE 1 CAPSULE BY MOUTH EVERY DAY      LOV:7/19/2024 Neftali Cruz MD   Due back to clinic per last office note:  RTC as needed   NOV: Visit date not found      [place]: 8/12/24 #30 (30 days)    Urine drug screen (if applicable): N/A  Pain contract: N/A    LOV plan (if weaning or changing medications): Continues to do well on Cymbalta. I refilled her for 1 year. Return in 1 year for follow-up.

## 2024-10-07 ENCOUNTER — HOSPITAL ENCOUNTER (OUTPATIENT)
Dept: BONE DENSITY | Age: 74
Discharge: HOME | End: 2024-10-07
Attending: INTERNAL MEDICINE
Payer: MEDICARE

## 2024-10-07 DIAGNOSIS — Z78.0 ASYMPTOMATIC POSTMENOPAUSAL STATUS: ICD-10-CM

## 2024-10-07 PROCEDURE — 77080 DXA BONE DENSITY AXIAL: CPT | Performed by: INTERNAL MEDICINE

## 2024-10-09 RX ORDER — DULOXETIN HYDROCHLORIDE 60 MG/1
60 CAPSULE, DELAYED RELEASE ORAL DAILY
Qty: 30 CAPSULE | Refills: 0 | Status: SHIPPED | OUTPATIENT
Start: 2024-10-09

## 2024-10-09 NOTE — TELEPHONE ENCOUNTER
Refill Request    Medication request: DULOXETINE 60 MG Oral Cap DR Particles. TAKE 1 CAPSULE BY MOUTH EVERY DAY     LOV:7/19/2024 Neftali Cruz MD   Due back to clinic per last office note:  Return in 1 year for follow-up.  NOV: Visit date not found      [place]: 9/9/24 #30 (30 days) (no refills were sent)    Urine drug screen (if applicable): N/A  Pain contract: N/A    LOV plan (if weaning or changing medications): Continues to do well on Cymbalta.  I refilled her for 1 year.

## 2024-11-04 RX ORDER — DULOXETIN HYDROCHLORIDE 60 MG/1
60 CAPSULE, DELAYED RELEASE ORAL DAILY
Qty: 30 CAPSULE | Refills: 0 | Status: SHIPPED | OUTPATIENT
Start: 2024-11-04

## 2024-11-04 NOTE — TELEPHONE ENCOUNTER
Refill Request    Medication request: DULOXETINE 60 MG Oral Cap DR Particles.  TAKE 1 CAPSULE BY MOUTH EVERY DAY      LOV:7/19/2024 Neftali Cruz MD   Due back to clinic per last office note:  RTC as needed   NOV: Visit date not found      [place]: 10/9/24 #30 (30 days)    Urine drug screen (if applicable): N/A  Pain contract: N/A    LOV plan (if weaning or changing medications): This has been very well-controlled with Cymbalta for years. No pain.

## 2024-11-12 ENCOUNTER — OFFICE VISIT (OUTPATIENT)
Dept: OTOLARYNGOLOGY | Facility: CLINIC | Age: 74
End: 2024-11-12
Payer: MEDICARE

## 2024-11-12 DIAGNOSIS — H61.23 BILATERAL IMPACTED CERUMEN: Primary | ICD-10-CM

## 2024-11-12 PROCEDURE — 69210 REMOVE IMPACTED EAR WAX UNI: CPT | Performed by: OTOLARYNGOLOGY

## 2024-11-12 NOTE — PROGRESS NOTES
Arina Munoz is a 74 year old female.    Chief Complaint   Patient presents with    Ear Wax     Ear cleaning        HISTORY OF PRESENT ILLNESS    Patient presents for cerumen removal. No other complaints or concerns at this time    Social History     Socioeconomic History    Marital status:    Tobacco Use    Smoking status: Former     Current packs/day: 0.00     Types: Cigarettes     Quit date:      Years since quittin.8    Smokeless tobacco: Never   Vaping Use    Vaping status: Never Used   Substance and Sexual Activity    Alcohol use: Yes     Comment: Occasionally    Drug use: No   Other Topics Concern    Caffeine Concern Yes     Comment: Coffee: 2 cups daily    Exercise Yes       Family History   Problem Relation Age of Onset    Cancer Father        Past Medical History:    Esophageal reflux    Essential hypertension    Gout    Hearing impairment    Bilateral hearing aides    Hyperlipidemia    Prediabetes    Visual impairment    Glasses and Contacts       Past Surgical History:   Procedure Laterality Date    Carpal tunnel release Bilateral     Colonoscopy  2019    Colonoscopy N/A 2019    Procedure: COLONOSCOPY, POSSIBLE BIOPSY, POSSIBLE POLYPECTOMY 66114;  Surgeon: Tanmay Klein MD;  Location: [facility], [facility]    Excis lumbar disk,one level      Knee replacement surgery Left     [facility] biopsy stereo nodule 1 site right (cpt=19081)      2022    [facility] localization wire 1 site left (cpt=19281)      benign       REVIEW OF SYSTEMS    System Neg/Pos Details   Constitutional Negative Fatigue, fever and weight loss.   ENMT Negative Drooling.   Eyes Negative Blurred vision and vision changes.   Respiratory Negative Dyspnea and wheezing.   Cardio Negative Chest pain, irregular heartbeat/palpitations and syncope.   GI Negative Abdominal pain and diarrhea.   Endocrine Negative Cold intolerance and heat intolerance.   Neuro Negative Tremors.   Psych Negative Anxiety and depression.    Integumentary Negative Frequent skin infections, pigment change and rash.   Hema/Lymph Negative Easy bleeding and easy bruising.           PHYSICAL EXAM    There were no vitals taken for this visit.       Constitutional Normal Overall appearance - Normal.        Neck Exam Normal Inspection - Normal. Palpation - Normal. Parotid gland - Normal. Thyroid gland - Normal.             Head/Face Normal Facial features - Normal. Eyebrows - Normal. Skull - Normal.             Ears Normal Inspection - Right: Normal, Left: Normal. Canal - Right: Normal, Left: Normal. TM - Right: Normal, Left: Normal.   Skin Normal Inspection - Normal.                              Canals:  Right: Canal reveals cerumen impaction,   Left: Canal reveals cerumen impaction,     Tympanic Membranes:  Right: Normal tympanic membrane.   Left: Normal tympanic membrane.     TM Visualized Method:   Right TM examined via otomicroscopy.    Left TM examined via otomicroscopy.      PROCEDURE:    Removal of cerumen impaction   The cerumen impaction was completely removed using microscopy.   Removal was completed by using acurette and/or suction.   Comments: Return to clinic as needed.  Avoid q-tips, water precautions and use over the counter wax remedies as needed.      Current Outpatient Medications:     DULOXETINE 60 MG Oral Cap DR Particles, TAKE 1 CAPSULE BY MOUTH EVERY DAY, Disp: 30 capsule, Rfl: 0    Cholecalciferol (VITAMIN D HIGH POTENCY OR), Take by mouth daily., Disp: , Rfl:     lisinopril 5 MG Oral Tab, Take 1 tablet (5 mg total) by mouth daily., Disp: , Rfl:     Metoprolol Succinate ER 50 MG Oral Tablet 24 Hr, Take 1 tablet (50 mg total) by mouth daily., Disp: , Rfl:     Calcium Carbonate-Vitamin D (CALCIUM 500 + D OR), Take 1 tablet by mouth daily., Disp: , Rfl:     allopurinol 100 MG Oral Tab, Take 2 tablets (200 mg total) by mouth daily., Disp: , Rfl:     furosemide 20 MG Oral Tab, Take 1 tablet (20 mg total) by mouth daily., Disp: , Rfl:      Atorvastatin Calcium 10 MG Oral Tab, Take 1 tablet (10 mg total) by mouth every morning., Disp: , Rfl:   ASSESSMENT AND PLAN    1. Bilateral impacted cerumen        All cerumen was removed using microscopy. I have asked the patient to return to see me as needed for repeat cerumen removal in the future.      Cisco Hedrick MD    11/12/2024    7:40 AM

## 2024-12-02 RX ORDER — DULOXETIN HYDROCHLORIDE 60 MG/1
60 CAPSULE, DELAYED RELEASE ORAL DAILY
Qty: 30 CAPSULE | Refills: 0 | Status: SHIPPED | OUTPATIENT
Start: 2024-12-02

## 2024-12-02 NOTE — TELEPHONE ENCOUNTER
Refill Request    Medication request: DULOXETINE 60 MG Oral Cap DR Particles. TAKE 1 CAPSULE BY MOUTH EVERY DAY      LOV:7/19/2024 Neftali Cruz MD   Due back to clinic per last office note:  RTC as needed   NOV: Visit date not found      [place]: 11/4/24 #30 (30 days)    Urine drug screen (if applicable): N/A  Pain contract: N/A    LOV plan (if weaning or changing medications): Continues to do well on Cymbalta.

## 2025-02-10 NOTE — CM/SW NOTE
04/04/24 1000   Discharge disposition   Expected discharge disposition Home-Health   Post Acute Care Provider Other  ([facility])   Discharge transportation Private car     Kristin Somers MBA BSN RN CRRN   RN Case Manager  [phone]   
DSC received email from [facility], , Pt has been referred to [facility] prior to surgery by Dr. Hamilton.     Elisa Pro DSC     
Department  notified of request for HHC, aidin referrals started. Assigned CM/SW to follow up with pt/family on further discharge planning.     Elisa Pro, DSC    
Per chart review, patient pre ortho with [facility].     DSC sent referral in aidin.     PT/OT pending.     F2F for RN/PT entered.     Plan: Anticipate home with [facility] pending therapy eval and medical clearance    Shey Don RN, BSN      
10-Feb-2025 11:30

## 2025-03-21 ENCOUNTER — APPOINTMENT (OUTPATIENT)
Dept: CT IMAGING | Age: 75
End: 2025-03-21
Attending: EMERGENCY MEDICINE
Payer: MEDICARE

## 2025-03-21 ENCOUNTER — HOSPITAL ENCOUNTER (OUTPATIENT)
Age: 75
Discharge: HOME | End: 2025-03-21
Attending: EMERGENCY MEDICINE
Payer: MEDICARE

## 2025-03-21 VITALS
OXYGEN SATURATION: 99 % | HEART RATE: 71 BPM | SYSTOLIC BLOOD PRESSURE: 125 MMHG | RESPIRATION RATE: 14 BRPM | DIASTOLIC BLOOD PRESSURE: 56 MMHG | TEMPERATURE: 98 F

## 2025-03-21 DIAGNOSIS — H66.92 LEFT OTITIS MEDIA, UNSPECIFIED OTITIS MEDIA TYPE: Primary | ICD-10-CM

## 2025-03-21 PROCEDURE — 70450 CT HEAD/BRAIN W/O DYE: CPT | Performed by: EMERGENCY MEDICINE

## 2025-03-21 PROCEDURE — 99214 OFFICE O/P EST MOD 30 MIN: CPT

## 2025-03-21 NOTE — DISCHARGE INSTRUCTIONS
Thank you for visiting our emergency department for your healthcare needs.  Please follow-up with your regular doctor in the next 1 to 2 days.  If you have any additional problems please return to the immediate care.  Please take over-the-counter Tylenol and/or Motrin for pain and fevers.  Please stop amoxicillin.  Start Augmentin as prescribed.

## 2025-03-21 NOTE — ED INITIAL ASSESSMENT (HPI)
Patient reports left ear pain, left side of head pain including jaw and neck 1 week ago.  Now with skin lesions to left scalp and around left side of mouth.  Patient seen by dentist, dentist states no dental cause of pain.

## 2025-03-21 NOTE — ED PROVIDER NOTES
Patient Seen in: [facility]      History     Chief Complaint   Patient presents with    Headache     Started with toothache. Now have have a ear ache, cancer sores, fever blisters, and piercing headache. Saw a dentist and a endodontist. The took xrays and did a consultation. No problems with the tooth. They recommended I see a physician. - Entered by patient     Stated Complaint: Headache - Started with toothache. Now have have a ear ache, cancer sores, feve*    Subjective:     74-year-old female presents today for evaluation toothache, left ear pain, left-sided headache and scalp pain which has been going on for the past week.  Was started on amoxicillin by her dentist 3 days ago but symptoms have continued.  Was recommended to see a physician.  Has not called her doctor.  No vomiting or diarrhea.  No cough runny nose sore throat.  No numbness, tingling, weakness.  No visual changes    Objective:   Past Medical History:    Esophageal reflux    Essential hypertension    Gout    Hearing impairment    Bilateral hearing aides    Hyperlipidemia    Prediabetes    Visual impairment    Glasses and Contacts              Past Surgical History:   Procedure Laterality Date    Carpal tunnel release Bilateral     Colonoscopy  2019    Colonoscopy N/A 2019    Procedure: COLONOSCOPY, POSSIBLE BIOPSY, POSSIBLE POLYPECTOMY 58852;  Surgeon: Tanmay Klein MD;  Location: [facility], [facility]    Excis lumbar disk,one level      Knee replacement surgery Left     [facility] biopsy stereo nodule 1 site right (cpt=19081)      2022    [facility] localization wire 1 site left (cpt=19281)      benign                Social History     Socioeconomic History    Marital status:    Tobacco Use    Smoking status: Former     Current packs/day: 0.00     Types: Cigarettes     Quit date:      Years since quittin.2    Smokeless tobacco: Never   Vaping Use    Vaping status: Never Used   Substance and Sexual Activity     Alcohol use: Yes     Comment: Occasionally    Drug use: No   Other Topics Concern    Caffeine Concern Yes     Comment: Coffee: 2 cups daily    Exercise Yes   Social History Narrative    The patient does not use an assistive device..      The patient does live in a home with stairs.     Social Drivers of Health     Food Insecurity: No Food Insecurity (5/17/2024)    Received from [facility]    Hunger Vital Sign     Worried About Running Out of Food in the Last Year: Never true     Ran Out of Food in the Last Year: Never true   Transportation Needs: No Transportation Needs (5/17/2024)    Received from [facility]    PRAPARE - Transportation     Lack of Transportation (Medical): No     Lack of Transportation (Non-Medical): No   Housing Stability: Low Risk  (5/17/2024)    Received from [facility]    Housing Stability Vital Sign     Unable to Pay for Housing in the Last Year: No     Number of Places Lived in the Last Year: 1     Unstable Housing in the Last Year: No                Physical Exam     ED Triage Vitals [03/21/25 1411]   /56   Pulse 71   Resp 14   Temp 98.3 °F (36.8 °C)   Temp src Oral   SpO2 99 %   O2 Device None (Room air)       Current:/56   Pulse 71   Temp 98.3 °F (36.8 °C) (Oral)   Resp 14   SpO2 99%         Physical Exam  Vitals reviewed.   Constitutional:       General: She is not in acute distress.     Appearance: Normal appearance. She is not ill-appearing or toxic-appearing.   HENT:      Head: Normocephalic and atraumatic.      Comments: No temporal artery tenderness at this time.     Right Ear: Tympanic membrane normal.      Left Ear: Tympanic membrane is erythematous and bulging.      Mouth/Throat:      Mouth: Mucous membranes are moist.      Pharynx: Oropharynx is clear. No pharyngeal swelling, oropharyngeal exudate or posterior oropharyngeal erythema.   Eyes:      Extraocular Movements: Extraocular movements  intact.      Conjunctiva/sclera: Conjunctivae normal.      Pupils: Pupils are equal, round, and reactive to light.   Cardiovascular:      Rate and Rhythm: Normal rate and regular rhythm.   Pulmonary:      Effort: Pulmonary effort is normal.      Breath sounds: Normal breath sounds.   Musculoskeletal:      Cervical back: Neck supple. No rigidity.   Skin:     General: Skin is warm and dry.   Neurological:      Mental Status: She is alert and oriented to person, place, and time.      Cranial Nerves: No cranial nerve deficit, dysarthria or facial asymmetry.      Sensory: No sensory deficit.      Motor: No weakness.   Psychiatric:         Mood and Affect: Mood normal.         ED Course   Labs Reviewed - No data to display  Imaging:  CT BRAIN OR HEAD (CPT=70450)    Result Date: 3/21/2025  CONCLUSION:  1. No acute intracranial finding.    Dictated by (CST): Zenon Casiano MD on 3/21/2025 at 3:04 PM     Finalized by (CST): Zenon Casiano MD on 3/21/2025 at 3:05 PM                        MDM        74 year old female with left ear pain scalp pain.  No vesicular rash.  Does have left otitis.  Discussed with patient at length.  Will up antibiotics to Augmentin.  Patient will follow-up with her primary care doctor.  Discussed if patient has increasing worsening pain in her temple she is to go to the emergency department for further evaluation.  Discussed temporal arteritis.    Differential diagnosis (including but not limited to):  Left otitis media, viral syndrome, shingles, temporal arteritis    ED course:  Pulse Ox: 99% on room air which is normal      Comment: Please note this report has been produced using speech recognition software and may contain errors related to that system including errors in grammar, punctuation, and spelling, as well as words and phrases that may be inappropriate. If there are any questions or concerns please feel free to contact the dictating provider for clarification.          Medical Decision  Making      Disposition and Plan     Clinical Impression:  1. Left otitis media, unspecified otitis media type         Disposition:  Discharge  3/21/2025  3:09 pm    Follow-up:  Paty Gamble MD  [address] [place] [address] [id number] [phone]    Schedule an appointment as soon as possible for a visit             Medications Prescribed:  Current Discharge Medication List        START taking these medications    Details   amoxicillin clavulanate 875-125 MG Oral Tab Take 1 tablet by mouth 2 (two) times daily for 7 days.  Qty: 14 tablet, Refills: 0                 Supplementary Documentation:                                                            Jitendra Arndt MD  3/21/2025  2:41 PM

## 2025-03-22 ENCOUNTER — HOSPITAL ENCOUNTER (OUTPATIENT)
Age: 75
Discharge: HOME | End: 2025-03-22
Payer: MEDICARE

## 2025-03-22 VITALS
OXYGEN SATURATION: 98 % | RESPIRATION RATE: 18 BRPM | SYSTOLIC BLOOD PRESSURE: 123 MMHG | TEMPERATURE: 98 F | DIASTOLIC BLOOD PRESSURE: 68 MMHG | HEART RATE: 67 BPM

## 2025-03-22 DIAGNOSIS — B02.9 HERPES ZOSTER WITHOUT COMPLICATION: Primary | ICD-10-CM

## 2025-03-22 PROCEDURE — 99213 OFFICE O/P EST LOW 20 MIN: CPT

## 2025-03-22 RX ORDER — VALACYCLOVIR HYDROCHLORIDE 1 G/1
1000 TABLET, FILM COATED ORAL 3 TIMES DAILY
Qty: 21 TABLET | Refills: 0 | Status: SHIPPED | OUTPATIENT
Start: 2025-03-22 | End: 2025-03-29

## 2025-03-22 NOTE — ED INITIAL ASSESSMENT (HPI)
Patient arrived ambulatory to room. Patient was seen in the IC yesterday. +rash to the left side of the face/left ear. Patient concerned for shingles. No fevers

## 2025-03-22 NOTE — ED PROVIDER NOTES
Patient Seen in: [facility]    History     Chief Complaint   Patient presents with    Wound Care     Shingles symptoms? - Entered by patient    Rash     Stated Complaint: Wound Care - Shingles symptoms?    HPI    Arina Munoz is a 74 year old female who presents with chief complaint of rash to left side of face.   Onset yesterday.  Patient states she was seen at this immediate care yesterday for left-sided facial pain and left earache.  Rash now present.  Patient voices concern for shingles.  Patient denies exposure to new medication, food, soap or plant.  Patient denies sick contacts.  Patient denies fever, chills, abdominal pain, nausea, vomiting, diarrhea, constipation, sore throat, cough, dyspnea, wheeze, mouth/throat swelling, otorrhea.      Past Medical History:    Esophageal reflux    Essential hypertension    Gout    Hearing impairment    Bilateral hearing aides    Hyperlipidemia    Prediabetes    Visual impairment    Glasses and Contacts       Past Surgical History:   Procedure Laterality Date    Carpal tunnel release Bilateral     Colonoscopy  2019    Colonoscopy N/A 2019    Procedure: COLONOSCOPY, POSSIBLE BIOPSY, POSSIBLE POLYPECTOMY 72864;  Surgeon: Tanmay Klein MD;  Location: [facility], [facility]    Excis lumbar disk,one level      Knee replacement surgery Left     [facility] biopsy stereo nodule 1 site right (cpt=19081)      2022    [facility] localization wire 1 site left (cpt=19281)      benign            Family History   Problem Relation Age of Onset    Cancer Father        Social History     Socioeconomic History    Marital status:    Tobacco Use    Smoking status: Former     Current packs/day: 0.00     Types: Cigarettes     Quit date:      Years since quittin.2    Smokeless tobacco: Never   Vaping Use    Vaping status: Never Used   Substance and Sexual Activity    Alcohol use: Yes     Comment: Occasionally    Drug use: No   Other Topics Concern     Caffeine Concern Yes     Comment: Coffee: 2 cups daily    Exercise Yes   Social History Narrative    The patient does not use an assistive device..      The patient does live in a home with stairs.     Social Drivers of Health     Food Insecurity: No Food Insecurity (5/17/2024)    Received from [facility]    Hunger Vital Sign     Worried About Running Out of Food in the Last Year: Never true     Ran Out of Food in the Last Year: Never true   Transportation Needs: No Transportation Needs (5/17/2024)    Received from [facility]    PRAPARE - Transportation     Lack of Transportation (Medical): No     Lack of Transportation (Non-Medical): No   Housing Stability: Low Risk  (5/17/2024)    Received from [facility]    Housing Stability Vital Sign     Unable to Pay for Housing in the Last Year: No     Number of Places Lived in the Last Year: 1     Unstable Housing in the Last Year: No       Review of Systems    Positive for stated complaint: Wound Care - Shingles symptoms?  Other systems are as noted in HPI.  Constitutional and vital signs reviewed.      All other systems reviewed and negative except as noted above.    PSFH elements reviewed from today and agreed except as otherwise stated in HPI.    Physical Exam     ED Triage Vitals [03/22/25 1256]   /68   Pulse 67   Resp 18   Temp 98.1 °F (36.7 °C)   Temp src    SpO2 98 %   O2 Device None (Room air)       Current:/68   Pulse 67   Temp 98.1 °F (36.7 °C)   Resp 18   SpO2 98%     PULSE OX within normal limits on room air as interpreted by this provider.    Constitutional: The patient is cooperative. Appears well-developed and well-nourished.  Mild discomfort.  Psychological: Alert, No abnormalities of mood, affect.  Head: Normocephalic/atraumatic.  Eyes: Pupils are equal round reactive to light.  Conjunctiva are within normal limits.  ENT: Oropharynx is clear.  No angioedema.  Left TM injected,  bulging.  Purulent fluid present proximally to intact left TM.  Right TM within normal limits.  Auditory canals within normal limits bilaterally.  No post auricular tenderness, adenopathy or erythema.  No otorrhea.  Neck: The neck is supple.  Nontender.  No meningeal signs.  Respiratory: Respiratory effort was normal.  There is no stridor.  Air entry is equal.  Cardiovascular: Regular rate and rhythm.  Capillary refill is brisk.  Genitourinary: Not examined.  Lymphatic: No gross lymphadenopathy noted.  Musculoskeletal: Musculoskeletal system is grossly intact.  There is no obvious deformity.  Neurological: No facial asymmetry.  Normal gait.  Normal sensory exam.  Patient exhibits normal speech.  Strength and range of motion symmetrical of all extremities x4.  Skin: Grouped, dermatomal distribution of scabbed, erythematous lesions present at left face.  Rash does not cross midline.  No ocular involvement.  No obvious bruising.           ED Course   Labs Reviewed - No data to display    MDM     Differential diagnosis including but not limited to herpes zoster's, allergic reaction, contact dermatitis, nonspecific rash    Creatinine clearance-75.487     EMR encounter on 3/21/2025 reviewed.    Physical exam remained stable as previously documented.  Physical exam findings discussed with patient.    I have given the patient instructions regarding their diagnoses, expectations, follow up, and ER precautions. I explained to the patient that emergent conditions may arise and to go to the ER for new, worsening or any persistent conditions. I've explained the importance of following up with their doctor as instructed. The patient verbalized understanding of the discharge instructions and plan.            Disposition and Plan     Clinical Impression:  1. Herpes zoster without complication        Disposition:  Discharge    Follow-up:  Paty Gamble MD  [address] [place] [id number]  [phone]    Call in 1  day  For follow-up      Medications Prescribed:  Current Discharge Medication List        START taking these medications    Details   valACYclovir 1 G Oral Tab Take 1 tablet (1,000 mg total) by mouth 3 (three) times daily for 7 days.  Qty: 21 tablet, Refills: 0

## 2025-04-03 ENCOUNTER — OFFICE VISIT (OUTPATIENT)
Dept: OTOLARYNGOLOGY | Facility: CLINIC | Age: 75
End: 2025-04-03

## 2025-04-03 DIAGNOSIS — H69.92 DYSFUNCTION OF LEFT EUSTACHIAN TUBE: Primary | ICD-10-CM

## 2025-04-03 PROCEDURE — 99213 OFFICE O/P EST LOW 20 MIN: CPT | Performed by: OTOLARYNGOLOGY

## 2025-04-03 PROCEDURE — 92504 EAR MICROSCOPY EXAMINATION: CPT | Performed by: OTOLARYNGOLOGY

## 2025-04-03 RX ORDER — METHYLPREDNISOLONE 4 MG/1
TABLET ORAL
Qty: 21 TABLET | Refills: 0 | Status: SHIPPED | OUTPATIENT
Start: 2025-04-03

## 2025-04-03 RX ORDER — PSEUDOEPHEDRINE HCL 120 MG/1
120 TABLET, FILM COATED, EXTENDED RELEASE ORAL EVERY 12 HOURS
Qty: 60 TABLET | Refills: 3 | Status: SHIPPED | OUTPATIENT
Start: 2025-04-03

## 2025-04-03 RX ORDER — FLUTICASONE PROPIONATE 50 MCG
1 SPRAY, SUSPENSION (ML) NASAL 2 TIMES DAILY
Qty: 16 G | Refills: 3 | Status: SHIPPED | OUTPATIENT
Start: 2025-04-03

## 2025-04-03 NOTE — PROGRESS NOTES
Arina Munoz is a 74 year old female.    Chief Complaint   Patient presents with    Ear Problem     Shingles in ear       HISTORY OF PRESENT ILLNESS  Presents with a history of developing shingles of the left side of her face with associated ear pain and discomfort on that left side contact her primary care physician and based on her ear discomfort and decreased hearing on the left and was told to contact our office.  She did irrigate her ear out recently feeling that it was full      Social History     Socioeconomic History    Marital status:    Tobacco Use    Smoking status: Former     Current packs/day: 0.00     Types: Cigarettes     Quit date:      Years since quittin.2    Smokeless tobacco: Never   Vaping Use    Vaping status: Never Used   Substance and Sexual Activity    Alcohol use: Yes     Comment: Occasionally    Drug use: No   Other Topics Concern    Caffeine Concern Yes     Comment: Coffee: 2 cups daily    Exercise Yes       Family History   Problem Relation Age of Onset    Cancer Father        Past Medical History:    Esophageal reflux    Essential hypertension    Gout    Hearing impairment    Bilateral hearing aides    Hyperlipidemia    Prediabetes    Visual impairment    Glasses and Contacts       Past Surgical History:   Procedure Laterality Date    Carpal tunnel release Bilateral     Colonoscopy  2019    Colonoscopy N/A 2019    Procedure: COLONOSCOPY, POSSIBLE BIOPSY, POSSIBLE POLYPECTOMY 51730;  Surgeon: Tanmay Klein MD;  Location: [facility], [facility]    Excis lumbar disk,one level      Knee replacement surgery Left     [facility] biopsy stereo nodule 1 site right (cpt=19081)      2022    [facility] localization wire 1 site left (cpt=19281)      benign         REVIEW OF SYSTEMS    System Neg/Pos Details   Constitutional Negative Fatigue, fever and weight loss.   ENMT Negative Drooling.   Eyes Negative Blurred vision and vision changes.   Respiratory Negative Dyspnea  and wheezing.   Cardio Negative Chest pain, irregular heartbeat/palpitations and syncope.   GI Negative Abdominal pain and diarrhea.   Endocrine Negative Cold intolerance and heat intolerance.   Neuro Negative Tremors.   Psych Negative Anxiety and depression.   Integumentary Negative Frequent skin infections, pigment change and rash.   Hema/Lymph Negative Easy bleeding and easy bruising.           PHYSICAL EXAM    There were no vitals taken for this visit.       Constitutional Normal Overall appearance - Normal.   Psychiatric Normal Orientation - Oriented to time, place, person & situation. Appropriate mood and affect.   Neck Exam Normal Inspection - Normal. Palpation - Normal. Parotid gland - Normal. Thyroid gland - Normal.   Eyes Normal Conjunctiva - Right: Normal, Left: Normal. Pupil - Right: Normal, Left: Normal. Fundus - Right: Normal, Left: Normal.   Neurological Normal Memory - Normal. Cranial nerves - Cranial nerves II through XII grossly intact.   Head/Face Normal Facial features - Normal. Eyebrows - Normal. Skull - Normal.        Nasopharynx Normal External nose - Normal. Lips/teeth/gums - Normal. Tonsils - Normal. Oropharynx - Normal.   Ears Normal Inspection - Right: Normal, Left: Normal. Canal - Right: Normal, Left: Normal. TM - Right: Normal, Left: Middle ear fluid   Skin Normal Inspection - Normal.        Lymph Detail Normal Submental. Submandibular. Anterior cervical. Posterior cervical. Supraclavicular.        Nose/Mouth/Throat Normal External nose - Normal. Lips/teeth/gums - Normal. Tonsils - Normal. Oropharynx - Normal.   Nose/Mouth/Throat Normal Nares - Right: Normal Left: Normal. Septum -Normal  Turbinates - Right: Normal, Left: Normal.   Microscopy  Binocular microscopy was performed. The affected ear(s) was/were examined and all debris removed using suction. The findings are described in the physical exam.   Ear cleared of debris using suction microscopy.  Middle ear fluid    Current  Outpatient Medications:     DULOXETINE 60 MG Oral Cap DR Particles, TAKE 1 CAPSULE BY MOUTH EVERY DAY, Disp: 30 capsule, Rfl: 0    Cholecalciferol (VITAMIN D HIGH POTENCY OR), Take by mouth daily., Disp: , Rfl:     lisinopril 5 MG Oral Tab, Take 1 tablet (5 mg total) by mouth daily., Disp: , Rfl:     Metoprolol Succinate ER 50 MG Oral Tablet 24 Hr, Take 1 tablet (50 mg total) by mouth daily., Disp: , Rfl:     Calcium Carbonate-Vitamin D (CALCIUM 500 + D OR), Take 1 tablet by mouth daily., Disp: , Rfl:     allopurinol 100 MG Oral Tab, Take 2 tablets (200 mg total) by mouth daily., Disp: , Rfl:     furosemide 20 MG Oral Tab, Take 1 tablet (20 mg total) by mouth daily., Disp: , Rfl:     Atorvastatin Calcium 10 MG Oral Tab, Take 1 tablet (10 mg total) by mouth every morning., Disp: , Rfl:     methylPREDNISolone 4 MG Oral Tablet Therapy Pack, Take by oral route., Disp: 21 tablet, Rfl: 0    pseudoephedrine  MG Oral Tablet 12 Hr, Take 1 tablet (120 mg total) by mouth every 12 (twelve) hours., Disp: 60 tablet, Rfl: 3    fluticasone propionate 50 MCG/ACT Nasal Suspension, 1 spray by Nasal route 2 (two) times daily., Disp: 16 g, Rfl: 3  ASSESSMENT AND PLAN    1. Dysfunction of left eustachian tube  Shingles of the face.  No vesicles noted within the ear canal.  Ear cleaned of all debris using suction microscopy.  She does appear to have middle ear fluid.  Start Sudafed Medrol Dosepak and fluticasone return to see me in 2 weeks with audiogram.  Avoid hearing aid and irrigating the left ear.  - pseudoephedrine  MG Oral Tablet 12 Hr; Take 1 tablet (120 mg total) by mouth every 12 (twelve) hours.  Dispense: 60 tablet; Refill: 3        This note was prepared using Dragon Medical voice recognition dictation software. As a result errors may occur. When identified these errors have been corrected. While every attempt is made to correct errors during dictation discrepancies may still exist    Cisco Hedrick,  MD    4/3/2025    11:00 AM

## 2025-04-12 ENCOUNTER — PATIENT MESSAGE (OUTPATIENT)
Dept: OTOLARYNGOLOGY | Facility: CLINIC | Age: 75
End: 2025-04-12

## 2025-04-15 ENCOUNTER — OFFICE VISIT (OUTPATIENT)
Dept: OTOLARYNGOLOGY | Facility: CLINIC | Age: 75
End: 2025-04-15
Payer: MEDICARE

## 2025-04-15 VITALS — WEIGHT: 183 LBS | BODY MASS INDEX: 29.41 KG/M2 | HEIGHT: 66 IN

## 2025-04-15 DIAGNOSIS — H60.392 OTHER INFECTIVE ACUTE OTITIS EXTERNA OF LEFT EAR: ICD-10-CM

## 2025-04-15 DIAGNOSIS — H91.90 HEARING LOSS, UNSPECIFIED HEARING LOSS TYPE, UNSPECIFIED LATERALITY: Primary | ICD-10-CM

## 2025-04-15 PROCEDURE — 92504 EAR MICROSCOPY EXAMINATION: CPT | Performed by: OTOLARYNGOLOGY

## 2025-04-15 PROCEDURE — 99213 OFFICE O/P EST LOW 20 MIN: CPT | Performed by: OTOLARYNGOLOGY

## 2025-04-15 RX ORDER — TOBRAMYCIN AND DEXAMETHASONE 3; 1 MG/ML; MG/ML
3 SUSPENSION/ DROPS OPHTHALMIC EVERY 8 HOURS
Qty: 10 ML | Refills: 0 | Status: SHIPPED | OUTPATIENT
Start: 2025-04-15

## 2025-04-15 RX ORDER — CIPROFLOXACIN 500 MG/1
500 TABLET, FILM COATED ORAL EVERY 12 HOURS
Qty: 14 TABLET | Refills: 0 | Status: SHIPPED | OUTPATIENT
Start: 2025-04-15

## 2025-04-15 NOTE — PROGRESS NOTES
Arina Munoz is a 74 year old female.    Chief Complaint   Patient presents with    Follow - Up     dysfunction of left eustachian tube    Hearing Loss     Hearing difficulty       HISTORY OF PRESENT ILLNESS  Presents with a history of developing shingles of the left side of her face with associated ear pain and discomfort on that left side contact her primary care physician and based on her ear discomfort and decreased hearing on the left and was told to contact our office.  She did irrigate her ear out recently feeling that it was full      4/15/25 had been irrigating her ear before I saw her last and states that she started developing some drainage since I last saw her and cannot hear out of that ear.  Was supposed to see me in 2 days but came in earlier due to the fact that her ear was bothering her more.  From that ear.  No other signs, symptoms or complaints.  Last visit she was started on Sudafed Medrol Dosepak and fluticasone.  She did have middle ear fluid at that time.      Social Hx on file[1]    Family History[2]    Past Medical History[3]    Past Surgical History[4]      REVIEW OF SYSTEMS    System Neg/Pos Details   Constitutional Negative Fatigue, fever and weight loss.   ENMT Negative Drooling.   Eyes Negative Blurred vision and vision changes.   Respiratory Negative Dyspnea and wheezing.   Cardio Negative Chest pain, irregular heartbeat/palpitations and syncope.   GI Negative Abdominal pain and diarrhea.   Endocrine Negative Cold intolerance and heat intolerance.   Neuro Negative Tremors.   Psych Negative Anxiety and depression.   Integumentary Negative Frequent skin infections, pigment change and rash.   Hema/Lymph Negative Easy bleeding and easy bruising.           PHYSICAL EXAM    Ht 5' 6\" (1.676 m)   Wt 183 lb (83 kg)   BMI 29.54 kg/m²        Constitutional Normal Overall appearance - Normal.   Psychiatric Normal Orientation - Oriented to time, place, person & situation. Appropriate mood  and affect.   Neck Exam Normal Inspection - Normal. Palpation - Normal. Parotid gland - Normal. Thyroid gland - Normal.   Eyes Normal Conjunctiva - Right: Normal, Left: Normal. Pupil - Right: Normal, Left: Normal. Fundus - Right: Normal, Left: Normal.   Neurological Normal Memory - Normal. Cranial nerves - Cranial nerves II through XII grossly intact.   Head/Face Normal Facial features - Normal. Eyebrows - Normal. Skull - Normal.        Nasopharynx Normal External nose - Normal. Lips/teeth/gums - Normal. Tonsils - Normal. Oropharynx - Normal.   Ears Normal Inspection - Right: Normal, Left: Normal. Canal - Right: Normal, Left: Otorrhea TM - Right: Normal, Left: Perforated   Skin Normal Inspection - Normal.        Lymph Detail Normal Submental. Submandibular. Anterior cervical. Posterior cervical. Supraclavicular.        Nose/Mouth/Throat Normal External nose - Normal. Lips/teeth/gums - Normal. Tonsils - Normal. Oropharynx - Normal.   Nose/Mouth/Throat Normal Nares - Right: Normal Left: Normal. Septum -Normal  Turbinates - Right: Normal, Left: Normal.   Microscopy  Binocular microscopy was performed. The affected ear(s) was/were examined and all debris removed using suction. The findings are described in the physical exam.   Left ear cleaned of all debris using suction microscopy.  Erythematous thickened tympanic membrane and canal skin with perforation of the tympanic membrane noted anteriorly on the left.  Medications - Current[5]  ASSESSMENT AND PLAN    1. Hearing loss, unspecified hearing loss type, unspecified laterality  - Audiology Referral - In Network    2. Other infective acute otitis externa of left ear  Has an acute infected ear on the left.  Most likely related to previous irrigations.  Ear cleaned of all debris using suction microscopy.  Ofloxacin eardrops instilled.  Start ciprofloxacin twice daily for 1 week as well as TobraDex eardrops and return to see me in 1 week for reevaluation and possible  debridement.  Perforation of tympanic membrane noted anteriorly on the left.  Last visit she had middle ear fluid and intact tympanic membrane so she may have had an acute infectious process with rupture.        This note was prepared using Dragon Medical voice recognition dictation software. As a result errors may occur. When identified these errors have been corrected. While every attempt is made to correct errors during dictation discrepancies may still exist    Cisco Hedrick MD    4/15/2025    9:07 AM         [1]   Social History  Socioeconomic History    Marital status:    Tobacco Use    Smoking status: Former     Current packs/day: 0.00     Types: Cigarettes     Quit date:      Years since quittin.3    Smokeless tobacco: Never   Vaping Use    Vaping status: Never Used   Substance and Sexual Activity    Alcohol use: Yes     Comment: Occasionally    Drug use: No   Other Topics Concern    Caffeine Concern Yes     Comment: Coffee: 2 cups daily    Exercise Yes   [2]   Family History  Problem Relation Age of Onset    Cancer Father    [3]   Past Medical History:   Esophageal reflux    Essential hypertension    Gout    Hearing impairment    Bilateral hearing aides    Hyperlipidemia    Prediabetes    Visual impairment    Glasses and Contacts   [4]   Past Surgical History:  Procedure Laterality Date    Carpal tunnel release Bilateral     Colonoscopy  2019    Colonoscopy N/A 2019    Procedure: COLONOSCOPY, POSSIBLE BIOPSY, POSSIBLE POLYPECTOMY 65394;  Surgeon: Tanmay Klein MD;  [facility]: [facility]    Excis lumbar disk,one level      Knee replacement surgery Left     [facility] biopsy stereo nodule 1 site right (cpt=19081)      2022    [facility] localization wire 1 site left (cpt=19281)      benign   [5]   Current Outpatient Medications:     ciprofloxacin 500 MG Oral Tab, Take 1 tablet (500 mg total) by mouth every 12 (twelve) hours., Disp: 14 tablet, Rfl: 0     tobramycin-dexamethasone 0.3-0.1 % Ophthalmic Suspension, Place 3 drops in ear(s) every 8 (eight) hours., Disp: 10 mL, Rfl: 0    methylPREDNISolone 4 MG Oral Tablet Therapy Pack, Take by oral route., Disp: 21 tablet, Rfl: 0    pseudoephedrine  MG Oral Tablet 12 Hr, Take 1 tablet (120 mg total) by mouth every 12 (twelve) hours., Disp: 60 tablet, Rfl: 3    fluticasone propionate 50 MCG/ACT Nasal Suspension, 1 spray by Nasal route 2 (two) times daily., Disp: 16 g, Rfl: 3    DULOXETINE 60 MG Oral Cap DR Particles, TAKE 1 CAPSULE BY MOUTH EVERY DAY, Disp: 30 capsule, Rfl: 0    Cholecalciferol (VITAMIN D HIGH POTENCY OR), Take by mouth daily., Disp: , Rfl:     lisinopril 5 MG Oral Tab, Take 1 tablet (5 mg total) by mouth daily., Disp: , Rfl:     Metoprolol Succinate ER 50 MG Oral Tablet 24 Hr, Take 1 tablet (50 mg total) by mouth daily., Disp: , Rfl:     Calcium Carbonate-Vitamin D (CALCIUM 500 + D OR), Take 1 tablet by mouth daily., Disp: , Rfl:     allopurinol 100 MG Oral Tab, Take 2 tablets (200 mg total) by mouth daily., Disp: , Rfl:     furosemide 20 MG Oral Tab, Take 1 tablet (20 mg total) by mouth daily., Disp: , Rfl:     Atorvastatin Calcium 10 MG Oral Tab, Take 1 tablet (10 mg total) by mouth every morning., Disp: , Rfl:

## 2025-04-22 ENCOUNTER — OFFICE VISIT (OUTPATIENT)
Dept: OTOLARYNGOLOGY | Facility: CLINIC | Age: 75
End: 2025-04-22
Payer: MEDICARE

## 2025-04-22 VITALS — WEIGHT: 183 LBS | HEIGHT: 66 IN | BODY MASS INDEX: 29.41 KG/M2

## 2025-04-22 DIAGNOSIS — H60.392 OTHER INFECTIVE ACUTE OTITIS EXTERNA OF LEFT EAR: Primary | ICD-10-CM

## 2025-04-22 PROCEDURE — 99213 OFFICE O/P EST LOW 20 MIN: CPT | Performed by: OTOLARYNGOLOGY

## 2025-04-22 PROCEDURE — 92504 EAR MICROSCOPY EXAMINATION: CPT | Performed by: OTOLARYNGOLOGY

## 2025-04-23 NOTE — PROGRESS NOTES
Arina Munoz is a 74 year old female.    Chief Complaint   Patient presents with    Follow - Up     hearing loss, unspecified hearing loss type, unspecified laterality       HISTORY OF PRESENT ILLNESS  Presents with a history of developing shingles of the left side of her face with associated ear pain and discomfort on that left side contact her primary care physician and based on her ear discomfort and decreased hearing on the left and was told to contact our office.  She did irrigate her ear out recently feeling that it was full      4/15/25 had been irrigating her ear before I saw her last and states that she started developing some drainage since I last saw her and cannot hear out of that ear.  Was supposed to see me in 2 days but came in earlier due to the fact that her ear was bothering her more.  From that ear.  No other signs, symptoms or complaints.  Last visit she was started on Sudafed Medrol Dosepak and fluticasone.  She did have middle ear fluid at that time.     4/22/25 history of acute otitis externa of the left ear.  Last visit she was started on TobraDex eardrops as well as ciprofloxacin tablets.  Doing very well no complaints or concerns.  Last use drops this morning.  Appears that the hearing is slightly off on the left ear.      Social Hx on file[1]    Family History[2]    Past Medical History[3]    Past Surgical History[4]      REVIEW OF SYSTEMS    System Neg/Pos Details   Constitutional Negative Fatigue, fever and weight loss.   ENMT Negative Drooling.   Eyes Negative Blurred vision and vision changes.   Respiratory Negative Dyspnea and wheezing.   Cardio Negative Chest pain, irregular heartbeat/palpitations and syncope.   GI Negative Abdominal pain and diarrhea.   Endocrine Negative Cold intolerance and heat intolerance.   Neuro Negative Tremors.   Psych Negative Anxiety and depression.   Integumentary Negative Frequent skin infections, pigment change and rash.   Hema/Lymph Negative Easy  bleeding and easy bruising.           PHYSICAL EXAM    Ht 5' 6\" (1.676 m)   Wt 183 lb (83 kg)   BMI 29.54 kg/m²        Constitutional Normal Overall appearance - Normal.   Psychiatric Normal Orientation - Oriented to time, place, person & situation. Appropriate mood and affect.   Neck Exam Normal Inspection - Normal. Palpation - Normal. Parotid gland - Normal. Thyroid gland - Normal.   Eyes Normal Conjunctiva - Right: Normal, Left: Normal. Pupil - Right: Normal, Left: Normal. Fundus - Right: Normal, Left: Normal.   Neurological Normal Memory - Normal. Cranial nerves - Cranial nerves II through XII grossly intact.   Head/Face Normal Facial features - Normal. Eyebrows - Normal. Skull - Normal.        Nasopharynx Normal External nose - Normal. Lips/teeth/gums - Normal. Tonsils - Normal. Oropharynx - Normal.   Ears Normal Inspection - Right: Normal, Left: Normal. Canal - Right: Normal, Left: Normal. TM - Right: Normal, Left: Normal.   Skin Normal Inspection - Normal.        Lymph Detail Normal Submental. Submandibular. Anterior cervical. Posterior cervical. Supraclavicular.        Nose/Mouth/Throat Normal External nose - Normal. Lips/teeth/gums - Normal. Tonsils - Normal. Oropharynx - Normal.   Nose/Mouth/Throat Normal Nares - Right: Normal Left: Normal. Septum -Normal  Turbinates - Right: Normal, Left: Normal.   Microscopy  Binocular microscopy was performed. The affected ear(s) was/were examined and all debris removed using suction. The findings are described in the physical exam.  Left ear cleaned of all debris using suction microscopy.  Normal exam otherwise.    Medications - Current[5]  ASSESSMENT AND PLAN    1. Other infective acute otitis externa of left ear  Resolved ear infection.  Normal use of drops.  Ear cleaned of all remaining debris using suction microscopy on the left side.  Return to see me as needed and she may use a hair dryer every night to help dry the ear.        This note was prepared using  Dragon Medical voice recognition dictation software. As a result errors may occur. When identified these errors have been corrected. While every attempt is made to correct errors during dictation discrepancies may still exist    Cisco Hedrick MD    2025    9:49 PM         [1]   Social History  Socioeconomic History    Marital status:    Tobacco Use    Smoking status: Former     Current packs/day: 0.00     Types: Cigarettes     Quit date:      Years since quittin.3    Smokeless tobacco: Never   Vaping Use    Vaping status: Never Used   Substance and Sexual Activity    Alcohol use: Yes     Comment: Occasionally    Drug use: No   Other Topics Concern    Caffeine Concern Yes     Comment: Coffee: 2 cups daily    Exercise Yes   [2]   Family History  Problem Relation Age of Onset    Cancer Father    [3]   Past Medical History:   Esophageal reflux    Essential hypertension    Gout    Hearing impairment    Bilateral hearing aides    Hyperlipidemia    Prediabetes    Visual impairment    Glasses and Contacts   [4]   Past Surgical History:  Procedure Laterality Date    Carpal tunnel release Bilateral     Colonoscopy  2019    Colonoscopy N/A 2019    Procedure: COLONOSCOPY, POSSIBLE BIOPSY, POSSIBLE POLYPECTOMY 88897;  Surgeon: Tanmay Klein MD;  Location: [facility] [place] [facility], [facility]    Excis lumbar disk,one level      Knee replacement surgery Left     [facility] biopsy stereo nodule 1 site right (cpt=19081)      2022    [facility] localization wire 1 site left (cpt=19281)      benign   [5]   Current Outpatient Medications:     DULOXETINE 60 MG Oral Cap DR Particles, TAKE 1 CAPSULE BY MOUTH EVERY DAY, Disp: 30 capsule, Rfl: 0    Cholecalciferol (VITAMIN D HIGH POTENCY OR), Take by mouth daily., Disp: , Rfl:     lisinopril 5 MG Oral Tab, Take 1 tablet (5 mg total) by mouth daily., Disp: , Rfl:     Metoprolol Succinate ER 50 MG Oral Tablet 24 Hr, Take 1 tablet (50 mg total) by mouth daily., Disp: , Rfl:      Calcium Carbonate-Vitamin D (CALCIUM 500 + D OR), Take 1 tablet by mouth daily., Disp: , Rfl:     allopurinol 100 MG Oral Tab, Take 2 tablets (200 mg total) by mouth daily., Disp: , Rfl:     furosemide 20 MG Oral Tab, Take 1 tablet (20 mg total) by mouth daily., Disp: , Rfl:     Atorvastatin Calcium 10 MG Oral Tab, Take 1 tablet (10 mg total) by mouth every morning., Disp: , Rfl:     ciprofloxacin 500 MG Oral Tab, Take 1 tablet (500 mg total) by mouth every 12 (twelve) hours., Disp: 14 tablet, Rfl: 0    tobramycin-dexamethasone 0.3-0.1 % Ophthalmic Suspension, Place 3 drops in ear(s) every 8 (eight) hours., Disp: 10 mL, Rfl: 0    methylPREDNISolone 4 MG Oral Tablet Therapy Pack, Take by oral route., Disp: 21 tablet, Rfl: 0    pseudoephedrine  MG Oral Tablet 12 Hr, Take 1 tablet (120 mg total) by mouth every 12 (twelve) hours., Disp: 60 tablet, Rfl: 3    fluticasone propionate 50 MCG/ACT Nasal Suspension, 1 spray by Nasal route 2 (two) times daily., Disp: 16 g, Rfl: 3

## 2025-05-09 ENCOUNTER — OFFICE VISIT (OUTPATIENT)
Dept: OTOLARYNGOLOGY | Facility: CLINIC | Age: 75
End: 2025-05-09
Payer: MEDICARE

## 2025-05-09 DIAGNOSIS — H60.392 OTHER INFECTIVE ACUTE OTITIS EXTERNA OF LEFT EAR: Primary | ICD-10-CM

## 2025-05-09 PROCEDURE — 99213 OFFICE O/P EST LOW 20 MIN: CPT | Performed by: OTOLARYNGOLOGY

## 2025-05-10 NOTE — PROGRESS NOTES
Arina Munoz is a 74 year old female.    Chief Complaint   Patient presents with    Ear Problem     Reports weird feeling left ear. Reports no pain, reports slight itchiness in left ear        HISTORY OF PRESENT ILLNESS  Presents with a history of developing shingles of the left side of her face with associated ear pain and discomfort on that left side contact her primary care physician and based on her ear discomfort and decreased hearing on the left and was told to contact our office.  She did irrigate her ear out recently feeling that it was full      4/15/25 had been irrigating her ear before I saw her last and states that she started developing some drainage since I last saw her and cannot hear out of that ear.  Was supposed to see me in 2 days but came in earlier due to the fact that her ear was bothering her more.  From that ear.  No other signs, symptoms or complaints.  Last visit she was started on Sudafed Medrol Dosepak and fluticasone.  She did have middle ear fluid at that time.     4/22/25 history of acute otitis externa of the left ear.  Last visit she was started on TobraDex eardrops as well as ciprofloxacin tablets.  Doing very well no complaints or concerns.  Last use drops this morning.  Appears that the hearing is slightly off on the left ear.    5/9/25 last visit she was being managed for acute otitis externa with TobraDex eardrops and ciprofloxacin tablets.  Still feels that her ear is slightly off on that left side.  She denies any hearing issues but just feels that the ear is different in terms of sensitivity.  No drainage no pain no other significant signs, symptoms or complaints.      Social Hx on file[1]    Family History[2]    Past Medical History[3]    Past Surgical History[4]      REVIEW OF SYSTEMS    System Neg/Pos Details   Constitutional Negative Fatigue, fever and weight loss.   ENMT Negative Drooling.   Eyes Negative Blurred vision and vision changes.   Respiratory Negative  Dyspnea and wheezing.   Cardio Negative Chest pain, irregular heartbeat/palpitations and syncope.   GI Negative Abdominal pain and diarrhea.   Endocrine Negative Cold intolerance and heat intolerance.   Neuro Negative Tremors.   Psych Negative Anxiety and depression.   Integumentary Negative Frequent skin infections, pigment change and rash.   Hema/Lymph Negative Easy bleeding and easy bruising.           PHYSICAL EXAM    There were no vitals taken for this visit.       Constitutional Normal Overall appearance - Normal.   Psychiatric Normal Orientation - Oriented to time, place, person & situation. Appropriate mood and affect.   Neck Exam Normal Inspection - Normal. Palpation - Normal. Parotid gland - Normal. Thyroid gland - Normal.   Eyes Normal Conjunctiva - Right: Normal, Left: Normal. Pupil - Right: Normal, Left: Normal. Fundus - Right: Normal, Left: Normal.   Neurological Normal Memory - Normal. Cranial nerves - Cranial nerves II through XII grossly intact.   Head/Face Normal Facial features - Normal. Eyebrows - Normal. Skull - Normal.        Nasopharynx Normal External nose - Normal. Lips/teeth/gums - Normal. Tonsils - Normal. Oropharynx - Normal.   Ears Normal Inspection - Right: Normal, Left: Normal. Canal - Right: Normal, Left: Normal. TM - Right: Normal, Left: Normal.   Skin Normal Inspection - Normal.        Lymph Detail Normal Submental. Submandibular. Anterior cervical. Posterior cervical. Supraclavicular.        Nose/Mouth/Throat Normal External nose - Normal. Lips/teeth/gums - Normal. Tonsils - Normal. Oropharynx - Normal.   Nose/Mouth/Throat Normal Nares - Right: Normal Left: Normal. Septum -Normal  Turbinates - Right: Normal, Left: Normal.     Medications - Current[5]  ASSESSMENT AND PLAN    1. Other infective acute otitis externa of left ear  I did reassure her that her ear looks fairly normal there is no vesicles lesions or other abnormalities.  We did discuss this with her symptoms may be some  type of postviral issue and that this could resolve over time.  No intervention indicated at this time.  I have asked her to simply return to see me at her convenience if symptoms persist or worsen.        This note was prepared using Dragon Medical voice recognition dictation software. As a result errors may occur. When identified these errors have been corrected. While every attempt is made to correct errors during dictation discrepancies may still exist    Cisco Hedrick MD    5/10/2025    12:48 PM         [1]   Social History  Socioeconomic History    Marital status:    Tobacco Use    Smoking status: Former     Current packs/day: 0.00     Types: Cigarettes     Quit date:      Years since quittin.3    Smokeless tobacco: Never   Vaping Use    Vaping status: Never Used   Substance and Sexual Activity    Alcohol use: Yes     Comment: Occasionally    Drug use: No   Other Topics Concern    Caffeine Concern Yes     Comment: Coffee: 2 cups daily    Exercise Yes   [2]   Family History  Problem Relation Age of Onset    Cancer Father    [3]   Past Medical History:   Esophageal reflux    Essential hypertension    Gout    Hearing impairment    Bilateral hearing aides    Hyperlipidemia    Prediabetes    Visual impairment    Glasses and Contacts   [4]   Past Surgical History:  Procedure Laterality Date    Carpal tunnel release Bilateral     Colonoscopy  2019    Colonoscopy N/A 2019    Procedure: COLONOSCOPY, POSSIBLE BIOPSY, POSSIBLE POLYPECTOMY 18971;  Surgeon: Tanmay Klein MD;  [facility] [facility]    Excis lumbar disk,one level      Knee replacement surgery Left     [facility] biopsy stereo nodule 1 site right (cpt=19081)      2022    [facility] localization wire 1 site left (cpt=19281)      benign   [5]   Current Outpatient Medications:     DULOXETINE 60 MG Oral Cap DR Particles, TAKE 1 CAPSULE BY MOUTH EVERY DAY, Disp: 30 capsule, Rfl: 0    Cholecalciferol (VITAMIN D HIGH POTENCY  OR), Take by mouth daily., Disp: , Rfl:     lisinopril 5 MG Oral Tab, Take 1 tablet (5 mg total) by mouth daily., Disp: , Rfl:     Metoprolol Succinate ER 50 MG Oral Tablet 24 Hr, Take 1 tablet (50 mg total) by mouth daily., Disp: , Rfl:     Calcium Carbonate-Vitamin D (CALCIUM 500 + D OR), Take 1 tablet by mouth daily., Disp: , Rfl:     allopurinol 100 MG Oral Tab, Take 2 tablets (200 mg total) by mouth daily., Disp: , Rfl:     furosemide 20 MG Oral Tab, Take 1 tablet (20 mg total) by mouth daily., Disp: , Rfl:     Atorvastatin Calcium 10 MG Oral Tab, Take 1 tablet (10 mg total) by mouth every morning., Disp: , Rfl:     ciprofloxacin 500 MG Oral Tab, Take 1 tablet (500 mg total) by mouth every 12 (twelve) hours., Disp: 14 tablet, Rfl: 0    tobramycin-dexamethasone 0.3-0.1 % Ophthalmic Suspension, Place 3 drops in ear(s) every 8 (eight) hours., Disp: 10 mL, Rfl: 0    methylPREDNISolone 4 MG Oral Tablet Therapy Pack, Take by oral route., Disp: 21 tablet, Rfl: 0    pseudoephedrine  MG Oral Tablet 12 Hr, Take 1 tablet (120 mg total) by mouth every 12 (twelve) hours., Disp: 60 tablet, Rfl: 3    fluticasone propionate 50 MCG/ACT Nasal Suspension, 1 spray by Nasal route 2 (two) times daily., Disp: 16 g, Rfl: 3

## 2025-08-20 RX ORDER — DULOXETIN HYDROCHLORIDE 60 MG/1
60 CAPSULE, DELAYED RELEASE ORAL DAILY
Qty: 90 CAPSULE | Refills: 3 | Status: SHIPPED | OUTPATIENT
Start: 2025-08-20 | End: 2026-08-15

## (undated) DEVICE — GLOVE SUR 7.5 SENSICARE PI MIC PIP CRM PWD F

## (undated) DEVICE — ADH DRMBND PRINEO SKN CLOS TOP

## (undated) DEVICE — 2T11 #2 PDO 36 X 36: Brand: 2T11 #2 PDO 36 X 36

## (undated) DEVICE — BOWL BNE CEM MIX DISP QUIK-VAC

## (undated) DEVICE — Device: Brand: STABLECUT®

## (undated) DEVICE — 48MM SQUARE HEAD PIN

## (undated) DEVICE — SYRINGE ONLY,20ML LUER LOCK: Brand: MEDLINE INDUSTRIES, INC.

## (undated) DEVICE — SUT MCRYL 2-0 36IN CT-1 ABSRB UD L36MM TAPR P

## (undated) DEVICE — PAD N ADH W3XL4IN POLY COT SFT PERF FLM ABSRB

## (undated) DEVICE — DISPOSABLE TOURNIQUET CUFF SINGLE BLADDER, DUAL PORT AND QUICK CONNECT CONNECTOR: Brand: COLOR CUFF

## (undated) DEVICE — COTTON UNDERCAST PADDING,REGULAR FINISH: Brand: WEBRIL

## (undated) DEVICE — Device: Brand: JELCO

## (undated) DEVICE — SHEET,DRAPE,70X100,STERILE: Brand: MEDLINE

## (undated) DEVICE — PIN 48X3MM

## (undated) DEVICE — WRAP COOLING KNEE W/ICE PILLOW

## (undated) DEVICE — SOLUTION IRRIG 3000ML 0.9% NACL FLX CONT

## (undated) DEVICE — BANDAGE COHESIVE W4INXL5YD TAN E POR SLF ADH

## (undated) DEVICE — SOLUTION PREP 26ML 0.7% POVACRYLEX 74% ISO

## (undated) DEVICE — SPONGE GZ 4XL4IN 100% COT 12 PLY TYP VII WVN

## (undated) DEVICE — SKIN PREP TRAY 4 COMPARTM TRAY: Brand: MEDLINE INDUSTRIES, INC.

## (undated) DEVICE — TRAY CATH FOLEY 16FR INCLUDE BARDX IC COMPLT CARE

## (undated) DEVICE — HOOD, PEEL-AWAY: Brand: FLYTE

## (undated) DEVICE — SOLUTION IRRIG 1000ML 0.9% NACL USP BTL

## (undated) DEVICE — HOOD: Brand: FLYTE

## (undated) DEVICE — SHEET,DRAPE,53X77,STERILE: Brand: MEDLINE

## (undated) DEVICE — PACK CDS TOTAL KNEE

## (undated) NOTE — LETTER
WHERE IS YOUR PAIN NOW?  Saud the areas on your body where you feel the described sensations.  Use the appropriate symbol.  Saud the areas of radiation.  Include all affected areas.  Just to complete the picture, please draw in the face.     ACHE:  ^ ^ ^   NUMBNESS:  0000   PINS & NEEDLES:  = = = =                              ^ ^ ^                       0000              = = = =                                    ^ ^ ^                       0000            = = = =      BURNING:  XXXX   STABBING: ////                  XXXX                ////                         XXXX          ////     Please saud the line below indicating your degree of pain right now  with 0 being no pain 10 being the worst pain possible.                                         0             1             2              3             4              5              6              7             8             9             10         Patient Signature:

## (undated) NOTE — LETTER
[address] 35   Date:   6/28/2019     Name:   Joe Enamorado    YOB: 1950   MRN:   [id number]       WHERE IS YOUR PAIN NOW? Sundeep the areas on your body where you feel the described sensations.   Use the appropriate

## (undated) NOTE — LETTER
Hospital Discharge Documentation  Please phone to schedule a hospital follow up appointment.    From: [facility] Hospitalist's Office  Phone: [phone]    Patient discharged time/date: 2024 11:07 AM  Patient discharge disposition:  Home Health Care Services       Discharge Summary - D/C Summary        Discharge Summary signed by Smiley Rodriguez MD at 2024  8:13 AM  Version 1 of 1      Author: Smiley Rodriguez MD Service: Hospitalist Author Type: Physician    Filed: 2024  8:13 AM Date of Service: 2024  8:10 AM Status: Signed    : Smiley Rodriguez MD (Physician)           [facility]  part of [facility]    DISCHARGE SUMMARY     Arina Munoz Patient Status:  Outpatient in a Bed    9/10/1950 MRN [id number]   Location [facility] Attending Smiley Rodriguez MD   Hosp Day # 0 PCP BONG REGAN MD, MD     Date of Admission: 4/3/2024  Date of Discharge:  2024    Discharge Disposition: Home or Self Care    Discharge Diagnosis:     OA s/p R TKA  Gout  HTN  HLD    History of Present Illness:     The patient is a 73-year-old  female who had chronic right knee pain, underlying end-stage severe primary osteoarthritis, failed outpatient conservative medical management options, scheduled today for above-mentioned procedure by her orthopedic surgeon, Dr. Villa Hamilton. Preoperatively, she had spinal block. Postoperatively transferred to PACU for further monitoring.     Brief Synopsis:     Patient underwent R TKA and tolerated the surgery well. The patient will follow up with PCP and Ortho as outpatient.   Discharge medications including pain meds, abx, dvt ppx ordered by ortho.     Patient is to remain compliant with all discharge medications and instructions and to follow up as advised.   Patient encouraged to make healthy lifestyle and dietary changes.    Lace+ Score: 33  59-90 High Risk  29-58 Medium Risk  0-28   Low Risk       TCM Follow-Up Recommendation:  LACE  29-58: Moderate Risk of readmission after discharge from the hospital.      Procedures during hospitalization:   R TKA    Incidental or significant findings and recommendations (brief descriptions):  None    Lab/Test results pending at Discharge:   None    Consultants:  Ortho    Discharge Medication List:     Discharge Medications        CHANGE how you take these medications        Instructions Prescription details   DULoxetine 60 MG Cpep  Commonly known as: Cymbalta  What changed: when to take this      Take 1 capsule (60 mg total) by mouth daily.   Quantity: 90 capsule  Refills: 3            CONTINUE taking these medications        Instructions Prescription details   allopurinol 100 MG Tabs  Commonly known as: Zyloprim      Take 2 tablets (200 mg total) by mouth daily.   Refills: 0     atorvastatin 10 MG Tabs  Commonly known as: Lipitor      Take 1 tablet (10 mg total) by mouth every morning.   Refills: 0     CALCIUM 500 + D OR      Take 1 tablet by mouth daily.   Refills: 0     furosemide 20 MG Tabs  Commonly known as: Lasix      Take 1 tablet (20 mg total) by mouth daily.   Refills: 0     lisinopril 5 MG Tabs  Commonly known as: Prinivil; Zestril      Take 1 tablet (5 mg total) by mouth daily.   Refills: 0     metoprolol succinate ER 50 MG Tb24  Commonly known as: Toprol XL      Take 1 tablet (50 mg total) by mouth daily.   Refills: 0     VITAMIN D HIGH POTENCY OR      Take by mouth daily.   Refills: 0            STOP taking these medications      Aleve 220 MG Caps  Generic drug: Naproxen Sodium        Meloxicam 15 MG Tabs  Commonly known as: Mobic                 [facility] reviewed: yes    Follow-up appointment:   Paty Gamble MD  [address] [facility] [id number] [phone]    Follow up in 1 week(s)      Villa Hamilton MD  [address] [facility] [address]  [phone]    Follow up in 1 week(s)      Appointments for Next 30 Days 4/4/2024 - 5/4/2024        Date Arrival Time Visit Type  Length Department Provider     4/4/2024  8:30 AM  [facility] PT IP FOLLOW UP [5565] 30 min [facility] Physical Therapy Chidi Ricketts PTA    Patient Instructions:         Location Instructions:     Masks are optional for all patients and visitors, unless otherwise indicated.               4/4/2024  2:00 PM  [facility] PT IP FOLLOW UP [5265] 30 min [facility] Physical Therapy Chidi Ricketts, MANDY    Patient Instructions:         Location Instructions:     Masks are optional for all patients and visitors, unless otherwise indicated.               5/2/2024  2:20 PM  [facility] SCR BLU [3308] 20 min [facility] Mammography [facility] RM2    Patient Instructions:     Please arrive 15 minutes prior to your appointment.    If breastfeeding, pump or breastfeed one hour prior to your appointment time.    Continuous glucose monitors must be removed so the appointment should be scheduled near the normal replacement date.    It is important to bring your previous mammography films to your appointment so that the radiologist has previous images to compare this exam to. Having your previous mammograms on file helps the radiologist notice subtle changes in your breast tissue that can alert us to a need for further studies. Without these images, the radiologist will not be able to detect the subtle changes and your final reading will be delayed until we receive them.    It is important that the annual screening mammogram be scheduled at least a year and a day after your last screening mammogram to ensure your insurance plan will cover the cost, unless you are experiencing breast related symptoms.    Do NOT use perfumes, deodorant, talcum powder, lotions, or creams on your breasts or underarms. They leave a coating that may be picked up by the x-rays, thereby distorting the mammogram.    Wear a two piece outfit the day of the exam. This allows you to be more comfortable during the exam.      Location  Instructions:     Your appointment will be at the [facility] located at [address] [facility] in [place], [place].&nbsp; For self-parking please park in the Green Lot. There is also  parking at the Main Entrance. Enter the door that says East Entrance. Inside the building walk to the right and check in with [facility]. The phone number for this location is [phone].  If any imaging exam related to this procedure has been done at a facility other than an [facility], please bring a copy of the previous films or CDs with you. If we do not have copies of your prior images (not performed at [facility] or [facility]) at the time of your exams, your results may be delayed and possibly result in rescheduling your appointment. You can also have your previous images sent to [facility] or [facility] prior to your appointment. Films may be mailed to:  [facility] Attn: Radiology Imaging Library [address] [place] [address]  [facility] Attn: Radiology File Room [address] [place] [id number]   You will be asked to fill out a history form prior to the exam.  Please bring your insurance card and photo ID. You will also need to bring your doctor's order unless your physician's office submitted the order electronically or faxed the order. Without the order, your test may be delayed or postponed.  The statements below are provided to all patients receiving an exam in our imaging department so you can be made aware of our procedures, which are designed for your safety and the best possible imaging.  Children: Children under the age of 12 must have another adult caregiver with them. Please do not bring your child/children without a caregiver. Because of the highly sensitive equipment and privacy of all our patients, children will not be permitted in the exam rooms, unless otherwise noted and in accordance with departmental  policy.  [employer]: Having an electronic [employer] account will allow you to view your medical records, information regarding appointments, as well as reports of your imaging and lab results.  PATIENT RESPONSIBILITY ESTIMATE  - (Estimate) We will provide you with your estimated remaining deductible and coinsurance balance for your services at the time of check in.  - (Payment) Please be aware that you may be asked for payment at the time of service.  Masks are optional for all patients and visitors, unless otherwise indicated.                      Vital signs:  Temp:  [97.2 °F (36.2 °C)-98.8 °F (37.1 °C)] 98.8 °F (37.1 °C)  Pulse:  [52-98] 76  Resp:  [10-18] 16  BP: ()/(47-72) 114/47  SpO2:  [92 %-99 %] 95 %    Physical Exam:    Gen: NAD AO x3  Chest: good air entry CTABL  CVS: normal s1 and s2 RR  Abd: NABS soft NT ND  Neuro: CN 2-12 grossly intact  Ext: no edema in bilateral LE    -----------------------------------------------------------------------------------------------  PATIENT DISCHARGE INSTRUCTIONS: See electronic chart    Smiley Rodriguez MD  Hospitalist    Time spent:  > 30 minutes    The 21st Century Cures Act makes medical notes like these available to patients in the interest of transparency. Please be advised this is a medical document. Medical documents are intended to carry relevant information, facts as evident, and the clinical opinion of the practitioner. The medical note is intended as peer to peer communication and may appear blunt or direct. It is written in medical language and may contain abbreviations or verbiage that are unfamiliar.       Electronically signed by Smiley Rodriguez MD on 4/4/2024  8:13 AM

## (undated) NOTE — LETTER
[address]   Date:   7/8/2019     Name:   Blanca Ennis    YOB: 1950   MRN:   [id number]       WHERE IS YOUR PAIN NOW? Sundeep the areas on your body where you feel the described sensations.   Use the appropriate s

## (undated) NOTE — Clinical Note
Dear Best Merritt,    I had the opportunity to see your patient Ryan Dudley recently. I am sending you this update, and I appreciate your confidence in me to care for your patients.  Please feel free call me with any questions at [phone] or contact me t

## (undated) NOTE — LETTER
[address]   Date:   11/7/2019     Name:   Telma Rodgers    YOB: 1950   MRN:   [id number]       WHERE IS YOUR PAIN NOW? Sundeep the areas on your body where you feel the described sensations.   Use the appropriate

## (undated) NOTE — LETTER
AUTHORIZATION FOR SURGICAL OPERATION OR OTHER PROCEDURE    1. I hereby authorize Dr. Beverly Cary and the [facility] staff assigned to my case to perform the following operation and/or procedure at the [facility]:    Trigger point injection    2.   My phy []  Parent    Responsible person                          []  Spouse  In case of minor or                    [] Other  _____________   Incompetent name:  __________________________________________________                               (please prin

## (undated) NOTE — LETTER
[address]   Date:   12/12/2019     Name:   Ramirez Patton    YOB: 1950   MRN:   [id number]       WHERE IS YOUR PAIN NOW? Sundeep the areas on your body where you feel the described sensations.   Use the appropriate

## (undated) NOTE — LETTER
[address]   Date:   9/6/2019     Name:   Sravan Wallace    YOB: 1950   MRN:   [id number]       WHERE IS YOUR PAIN NOW? Sundeep the areas on your body where you feel the described sensations.   Use the appropriate s

## (undated) NOTE — LETTER
[address]   Date:   1/7/2020     Name:   Mery Salmon    YOB: 1950   MRN:   [id number]       [facility]? Sundeep the areas on your body where you feel the described sensations.   Use the appropriate s

## (undated) NOTE — LETTER
[address], [facility]   Date:   11/29/2021     Name:   Umesh Phillips    YOB: 1950   MRN:   [id number]       [facility]?   Luis Settles the areas on your body where you feel the tommy

## (undated) NOTE — LETTER
10/29/2019      Unknown Press ALEXANDRIA Graham MD  Physical Medicine and Rehabilitation  [address] [facility], [address] [place] [address]  Dept: [phone]  Dept Fax: [phone]        RE: Consultation for Ezra Hodeg        Dear Darius Casas MD, MD,

## (undated) NOTE — LETTER
[facility], [place]  Authorization for Invasive Procedures  1. I hereby authorize Dr. Kaylene Mar  , my physician and whomever may be designated as the doctor's assistant, to perform the following operation and/or procedure: stereotactic biopsy with tomosyntheis of the right breast with clip placement on Patria Dye at [facility].    2. My physician has explained to me the nature and purpose of the operation or other procedure, possible alternative methods of treatment, the risks involved and the possibility of complications to me. I understand the probable consequences of declining the recommended procedure and the alternative methods of treatment. I acknowledge that no guarantee has been made as to the result that may be obtained. 3. I recognize that during the course of this operation or other procedure, unforeseen conditions may necessitate additional or different procedures than those listed above. I, therefore, further authorize and request that the above-named physician, his/her physician assistants, or designees perform such procedures as are, in his/her professional opinion, necessary and desirable. If I have a Do Not Attempt Resuscitation (DNAR) order in place, that status will be suspended while in the operating room, procedural suite, and during the recovery period unless otherwise explicitly stated by me (or a person authorized to consent on my behalf). The surgeon or my attending physician will determine when the applicable recovery period ends for purposes of reinstating the DNAR order. 4. Should the need arise during my operation or immediate post-operative period; I also consent to the administration of blood and/or blood products.  Further, I understand that despite careful testing and screening of blood and blood products, I may still be subject to ill effects as a result of recieving a blood transfusion an/or blood producst. The following are some, but not all, of the potential risks that can occur: fever and allergic reactions, hemolytic reactions, transmission of disease such as hepatitis, AIDS, cytomegalovirus (CMV), and flluid overload. In the event that I wish to have autologous transfusions of my own blood, or a directed donor transfusion, I will discuss this with my physician. 5. I consent to the photographing of the operations or procedures to be performed for the purposes of advancing medicine, science, and/or education, provided my identity is not revealed. If the procedure has been videotaped, the physician/surgeon will obtain the original videotape. The [facility] will not be responsible for storage or maintenance of this tape. 6. I consent to the presence of a  or observer as deemed necessary by my physician or his designee. 7. Any tissues or organs removed in the operation or other procedure may be disposed of by and at the discretion of [facility].    8. I understand that the physician and his/her physician assistants may not be employees or agents of [facility], [facility], nor [facility], but are independent medical practitioners who have been permitted to use its facilities for the care and treatment of their patients. 9. Patients having a sterilization procedure: I understand that if the procedure is successful the results will be permanent and it will therefore be impossible for me to inseminate, conceive or bear children. I also understand that the procedure is intended to result in sterility, although the result has not been guaranteed. 10. I CERTIFY THAT I HAVE READ AND FULLY UNDERSTAND THE ABOVE CONSENT TO OPERATION and/or OTHER PROCEDURE. 11. I acknowledge that my physician has explained sedation/analgesia administration to me including the risks and benefits.  I consent to the administration of sedation/analgesia as may be necessary or desirable in the judgment of my physician. Signature of Patient:  ________________________________________________ Date: _________Time: _________    Responsible person in case of minor or unconscious: _____________________________Relationship: ____________     Witness Signature: ____________________________________________ Date: __________ Time: ___________    Statement of Physician  My signature below affirms that prior to the time of the procedure, I have explained to the patient and/or her legal representative, the risks and benefits involved in the proposed treatment and any reasonable alternative to the proposed treatment. I have also explained the risks and benefits involved in the refusal of the proposed treatment and have answered the patient's questions. If I have a significant financial interest in this procedure/surgery, I have disclosed this and had a discussion with my patient.     Signature of Physician:   ________________________________________Date: _________Time:_______ Patient Name: Reji Cr  : 9/10/1950   Printed: 2022    Medical Record #: [id number]

## (undated) NOTE — LETTER
[place] Notifier: [employer]   SHAHRZAD. Patient Name: Zac Ozuna. Identification Number: [id number]      Advance Beneficiary Notice of Noncoverage (ABN)  NOTE:  If Medicare doesn’t pay for D. Trigger point injection below, you may have to pay.   Me not bill Medicare. You may ask to be paid now as I am responsible for payment. I cannot appeal if Medicare is not billed. ? OPTION 3. I don’t want the D. listed above.  I understand with this choice  I am not responsible for payment, and I cannot appeal to

## (undated) NOTE — LETTER
[address]   Date:   10/2/2019     Name:   Joe Tao    YOB: 1950   MRN:   [id number]       WHERE IS YOUR PAIN NOW? Sundeep the areas on your body where you feel the described sensations.   Use the appropriate

## (undated) NOTE — LETTER
[facility] SURGERY SCHEDULING FORM   [address] [facility]   [phone] (scheduling phone) [phone] (scheduling fax)     PATIENT INFORMATION   Last Name:      Michelle Fleming      First Name:    Jessica Lisa Completed by:    Naomie Grimm      Date:    10/2/2019

## (undated) NOTE — LETTER
[address]   Date:   8/6/2019     Name:   Edwin Mendoza    YOB: 1950   MRN:   [id number]       WHERE IS YOUR PAIN NOW? Sundeep the areas on your body where you feel the described sensations.   Use the appropriate s

## (undated) NOTE — LETTER
[facility] [address], [facility]   Date:   6/14/2023     Name:   Reji Cr    YOB: 1950   MRN:   [id number]       [facility]? Saud the areas on your body where you feel the described sensations. Use the appropriate symbol. Ramya Cuevas the areas of radiation. Include all affected areas. Just to complete the picture, please draw in the face. ACHE:  ^ ^ ^   NUMBNESS:  0000   PINS & NEEDLES:  = = = =                              ^ ^ ^                       0000              = = = =                                    ^ ^ ^                       0000            = = = =      BURNING:  XXXX   STABBING: ////                  XXXX                ////                         XXXX          ////     Please saud the line below indicating your degree of pain right now  with 0 being no pain 10 being the worst pain possible.                                          0             1             2              3             4              5              6              7             8             9             10         Patient Signature:

## (undated) NOTE — LETTER
[facility] SURGERY SCHEDULING FORM   [address] [facility]   [phone] (scheduling phone) [phone] (scheduling fax)     PATIENT INFORMATION   Last Name:      Lis Keane      First Name:    James Oscar Completed by:    Clayton aPrk      Date:    9/6/2019